# Patient Record
Sex: FEMALE | Race: WHITE | URBAN - METROPOLITAN AREA
[De-identification: names, ages, dates, MRNs, and addresses within clinical notes are randomized per-mention and may not be internally consistent; named-entity substitution may affect disease eponyms.]

---

## 2018-12-17 RX ORDER — FLUOXETINE HYDROCHLORIDE 40 MG/1
40 CAPSULE ORAL DAILY
Qty: 90 CAPSULE | Refills: 1 | Status: SHIPPED | OUTPATIENT
Start: 2018-12-17 | End: 2019-06-10 | Stop reason: SDUPTHER

## 2018-12-17 RX ORDER — BUPROPION HYDROCHLORIDE 150 MG/1
150 TABLET ORAL EVERY MORNING
Qty: 90 TABLET | Refills: 1 | Status: SHIPPED | OUTPATIENT
Start: 2018-12-17 | End: 2019-06-10 | Stop reason: SDUPTHER

## 2019-05-22 RX ORDER — ATENOLOL 25 MG/1
TABLET ORAL
Qty: 90 TABLET | Refills: 3 | Status: SHIPPED | OUTPATIENT
Start: 2019-05-22 | End: 2019-06-10 | Stop reason: SDUPTHER

## 2019-06-06 ENCOUNTER — TELEPHONE (OUTPATIENT)
Dept: PRIMARY CARE CLINIC | Age: 59
End: 2019-06-06

## 2019-06-06 NOTE — TELEPHONE ENCOUNTER
Pt's bp was refused by Dr. Angeles Henao. Pt will be out of meds the week of the 23rd. Asking if you could squeeze her in on 6/12 or 6/13 anytime and call her cell and leave date and time of appt. [phone]. Thankyou!

## 2019-06-10 RX ORDER — ATENOLOL 25 MG/1
TABLET ORAL
Qty: 90 TABLET | Refills: 0 | Status: SHIPPED | OUTPATIENT
Start: 2019-06-10 | End: 2019-08-23 | Stop reason: SDUPTHER

## 2019-06-10 RX ORDER — BUPROPION HYDROCHLORIDE 150 MG/1
150 TABLET ORAL EVERY MORNING
Qty: 90 TABLET | Refills: 0 | Status: SHIPPED | OUTPATIENT
Start: 2019-06-10 | End: 2019-07-01 | Stop reason: SDUPTHER

## 2019-06-10 RX ORDER — FLUOXETINE HYDROCHLORIDE 40 MG/1
40 CAPSULE ORAL DAILY
Qty: 90 CAPSULE | Refills: 0 | Status: SHIPPED | OUTPATIENT
Start: 2019-06-10 | End: 2019-07-01 | Stop reason: SDUPTHER

## 2019-07-01 RX ORDER — BUPROPION HYDROCHLORIDE 150 MG/1
TABLET ORAL
Qty: 30 TABLET | Refills: 1 | Status: SHIPPED | OUTPATIENT
Start: 2019-07-01 | End: 2019-09-19 | Stop reason: SDUPTHER

## 2019-07-01 RX ORDER — FLUOXETINE HYDROCHLORIDE 40 MG/1
CAPSULE ORAL
Qty: 30 CAPSULE | Refills: 1 | Status: SHIPPED | OUTPATIENT
Start: 2019-07-01 | End: 2019-08-23 | Stop reason: SDUPTHER

## 2019-07-24 ENCOUNTER — OFFICE VISIT (OUTPATIENT)
Dept: PRIMARY CARE CLINIC | Age: 59
End: 2019-07-24
Payer: COMMERCIAL

## 2019-07-24 VITALS
DIASTOLIC BLOOD PRESSURE: 78 MMHG | WEIGHT: 202.6 LBS | HEART RATE: 68 BPM | OXYGEN SATURATION: 97 % | SYSTOLIC BLOOD PRESSURE: 120 MMHG | BODY MASS INDEX: 38.28 KG/M2

## 2019-07-24 DIAGNOSIS — I10 ESSENTIAL HYPERTENSION: Primary | ICD-10-CM

## 2019-07-24 DIAGNOSIS — R53.83 FATIGUE, UNSPECIFIED TYPE: ICD-10-CM

## 2019-07-24 DIAGNOSIS — R06.83 SNORING: ICD-10-CM

## 2019-07-24 DIAGNOSIS — Z12.39 ENCOUNTER FOR SCREENING FOR MALIGNANT NEOPLASM OF BREAST: ICD-10-CM

## 2019-07-24 PROCEDURE — 99213 OFFICE O/P EST LOW 20 MIN: CPT | Performed by: FAMILY MEDICINE

## 2019-07-24 ASSESSMENT — ENCOUNTER SYMPTOMS
ABDOMINAL PAIN: 0
NAUSEA: 0
EYE DISCHARGE: 0
RHINORRHEA: 0
DIARRHEA: 0
WHEEZING: 0
COUGH: 0
SORE THROAT: 0
VOMITING: 0
EYE REDNESS: 0
SHORTNESS OF BREATH: 0

## 2019-07-24 ASSESSMENT — PATIENT HEALTH QUESTIONNAIRE - PHQ9: DEPRESSION UNABLE TO ASSESS: URGENT/EMERGENT SITUATION

## 2019-07-24 NOTE — PROGRESS NOTES
[facility] CARE  [address] [facility] [address]  Dept: 40 Candy Esqueda is a 62 y.o. female who presents today for her medical conditions/complaintsas noted below. Chief Complaint   Patient presents with    6 Month Follow-Up     Medication check. HPI:     HPI-has been feeling really tired. Feels like she is exhausted after just a 5 hour shift. Can sleep 12 hours and feels okay after that but then gets tired again really easily. LDL Cholesterol (mg/dL)   Date Value   12/10/2012 74     LDL Calculated (mg/dL)   Date Value   2018 37   2016 88       (goal LDL is <100)   AST (IU/L)   Date Value   2016 19     ALT (IU/L)   Date Value   2016 23     BUN (mg/dl)   Date Value   2016 15     BP Readings from Last 3 Encounters:   19 120/78   10/18/18 130/68   18 124/74          (goal 120/80)    Past Medical History:   Diagnosis Date    Alcohol dependence ([address]     CAD S/P percutaneous coronary angioplasty 12    LAD , ANGELINE    Depression     Dyslipidemia     HTN (hypertension)     Left ventricular diastolic dysfunction 1251    Obesity       Past Surgical History:   Procedure Laterality Date     SECTION      CORONARY ANGIOPLASTY WITH STENT PLACEMENT      MYRINGOTOMY AND TYMPANOSTOMY TUBE PLACEMENT         Family History   Problem Relation Age of Onset    Other Mother         hypertension    Macular Degen Other         macular degeration    Other Father         hx not known       Social History     Tobacco Use    Smoking status: Former Smoker     Packs/day: 1.00     Years: 40.00     Pack years: 40.00     Types: Cigarettes     Last attempt to quit: 2012     Years since quittin.6    Smokeless tobacco: Never Used   Substance Use Topics    Alcohol use:  Yes     Alcohol/week: 14.0 standard drinks     Types: 14 Cans of beer per week      Current Outpatient Medications

## 2020-03-06 ENCOUNTER — OFFICE VISIT (OUTPATIENT)
Dept: PRIMARY CARE CLINIC | Age: 60
End: 2020-03-06
Payer: COMMERCIAL

## 2020-03-06 VITALS
OXYGEN SATURATION: 97 % | WEIGHT: 208.8 LBS | BODY MASS INDEX: 39.45 KG/M2 | HEART RATE: 70 BPM | SYSTOLIC BLOOD PRESSURE: 128 MMHG | DIASTOLIC BLOOD PRESSURE: 70 MMHG

## 2020-03-06 PROCEDURE — G0444 DEPRESSION SCREEN ANNUAL: HCPCS | Performed by: FAMILY MEDICINE

## 2020-03-06 PROCEDURE — 1036F TOBACCO NON-USER: CPT | Performed by: FAMILY MEDICINE

## 2020-03-06 PROCEDURE — 99213 OFFICE O/P EST LOW 20 MIN: CPT | Performed by: FAMILY MEDICINE

## 2020-03-06 PROCEDURE — G8427 DOCREV CUR MEDS BY ELIG CLIN: HCPCS | Performed by: FAMILY MEDICINE

## 2020-03-06 PROCEDURE — 3017F COLORECTAL CA SCREEN DOC REV: CPT | Performed by: FAMILY MEDICINE

## 2020-03-06 PROCEDURE — G8417 CALC BMI ABV UP PARAM F/U: HCPCS | Performed by: FAMILY MEDICINE

## 2020-03-06 PROCEDURE — G8484 FLU IMMUNIZE NO ADMIN: HCPCS | Performed by: FAMILY MEDICINE

## 2020-03-06 RX ORDER — DICYCLOMINE HYDROCHLORIDE 10 MG/1
10 CAPSULE ORAL EVERY 6 HOURS PRN
Qty: 90 CAPSULE | Refills: 0 | Status: SHIPPED | OUTPATIENT
Start: 2020-03-06

## 2020-03-06 ASSESSMENT — ENCOUNTER SYMPTOMS
DIARRHEA: 0
RHINORRHEA: 0
NAUSEA: 0
ABDOMINAL PAIN: 0
COUGH: 0
EYE REDNESS: 0
VOMITING: 0
EYE DISCHARGE: 0
SHORTNESS OF BREATH: 0
WHEEZING: 0
SORE THROAT: 0

## 2020-03-06 ASSESSMENT — PATIENT HEALTH QUESTIONNAIRE - PHQ9
2. FEELING DOWN, DEPRESSED OR HOPELESS: 3
SUM OF ALL RESPONSES TO PHQ QUESTIONS 1-9: 12
SUM OF ALL RESPONSES TO PHQ QUESTIONS 1-9: 12
1. LITTLE INTEREST OR PLEASURE IN DOING THINGS: 3
10. IF YOU CHECKED OFF ANY PROBLEMS, HOW DIFFICULT HAVE THESE PROBLEMS MADE IT FOR YOU TO DO YOUR WORK, TAKE CARE OF THINGS AT HOME, OR GET ALONG WITH OTHER PEOPLE: 0
9. THOUGHTS THAT YOU WOULD BE BETTER OFF DEAD, OR OF HURTING YOURSELF: 0
7. TROUBLE CONCENTRATING ON THINGS, SUCH AS READING THE NEWSPAPER OR WATCHING TELEVISION: 0
6. FEELING BAD ABOUT YOURSELF - OR THAT YOU ARE A FAILURE OR HAVE LET YOURSELF OR YOUR FAMILY DOWN: 0
8. MOVING OR SPEAKING SO SLOWLY THAT OTHER PEOPLE COULD HAVE NOTICED. OR THE OPPOSITE, BEING SO FIGETY OR RESTLESS THAT YOU HAVE BEEN MOVING AROUND A LOT MORE THAN USUAL: 0
SUM OF ALL RESPONSES TO PHQ9 QUESTIONS 1 & 2: 6
3. TROUBLE FALLING OR STAYING ASLEEP: 3
4. FEELING TIRED OR HAVING LITTLE ENERGY: 3
5. POOR APPETITE OR OVEREATING: 0

## 2020-03-06 NOTE — PROGRESS NOTES
Alcohol/week: 14.0 standard drinks     Types: 14 Cans of beer per week      Current Outpatient Medications   Medication Sig Dispense Refill    dicyclomine (BENTYL) 10 MG capsule Take 1 capsule by mouth every 6 hours as needed (cramping) 90 capsule 0    buPROPion (WELLBUTRIN XL) 150 MG extended release tablet TAKE 1 TABLET EVERY MORNING 90 tablet 3    FLUoxetine (PROZAC) 40 MG capsule TAKE 1 CAPSULE DAILY 90 capsule 3    atenolol (TENORMIN) 25 MG tablet TAKE 1 TABLET DAILY 90 tablet 3    triamcinolone (KENALOG) 0.147 MG/GM topical spray Apply topically 2 times daily. 100 g 3    desoximetasone (TOPICORT) 0.05 % cream Apply 1 Squirt topically 2 times daily Apply topically 2 times daily.  fluticasone (FLONASE) 50 MCG/ACT nasal spray 1 spray by Nasal route daily      aspirin 325 MG tablet Take 81 mg by mouth daily       clopidogrel (PLAVIX) 75 MG tablet Take 75 mg by mouth daily.  atorvastatin (LIPITOR) 40 MG tablet Take 40 mg by mouth daily.  lisinopril-hydrochlorothiazide (PRINZIDE;ZESTORETIC) 10-12.5 MG per tablet Take 1 tablet by mouth daily.  nitroGLYCERIN (NITROSTAT) 0.4 MG SL tablet Place 0.4 mg under the tongue every 5 minutes as needed. No current facility-administered medications for this visit.       Allergies   Allergen Reactions    Amoxicillin     Trimethoprim     Pcn [Penicillins] Rash    Sulfa Antibiotics Rash       Health Maintenance   Topic Date Due    Cervical cancer screen  07/15/1979    Shingles Vaccine (1 of 2) 07/15/2008    Colon cancer screen colonoscopy  07/15/2008    Low dose CT lung screening  07/15/2013    Lipid screen  08/21/2019    Potassium monitoring  08/21/2019    Creatinine monitoring  08/21/2019    Flu vaccine (1) 09/01/2019    Breast cancer screen  08/21/2020    DTaP/Tdap/Td vaccine (2 - Td) 08/10/2022    Hepatitis C screen  Completed    HIV screen  Completed    Hepatitis A vaccine  Aged Out    Hepatitis B vaccine  Aged Out    Hib Content: Thought content normal.         Assessment:       Diagnosis Orders   1. Essential hypertension  CBC Auto Differential    Comprehensive Metabolic Panel    Vitamin B12 & Folate    Magnesium    TSH without Reflex   2. Mixed hyperlipidemia  CBC Auto Differential    Comprehensive Metabolic Panel    Vitamin B12 & Folate    Magnesium    TSH without Reflex   3. Left ventricular diastolic dysfunction  CBC Auto Differential    Comprehensive Metabolic Panel    Vitamin B12 & Folate    Magnesium    TSH without Reflex   4. Fatigue, unspecified type  CBC Auto Differential    Comprehensive Metabolic Panel    Vitamin B12 & Folate    Magnesium    TSH without Reflex        Plan:      Return in about 3 months (around 6/6/2020) for HTN f/u. Orders Placed This Encounter   Procedures    CBC Auto Differential     Standing Status:   Future     Standing Expiration Date:   3/7/2021    Comprehensive Metabolic Panel     Standing Status:   Future     Standing Expiration Date:   3/7/2021    Vitamin B12 & Folate     Standing Status:   Future     Standing Expiration Date:   3/7/2021    Magnesium     Standing Status:   Future     Standing Expiration Date:   3/6/2021    TSH without Reflex     Standing Status:   Future     Standing Expiration Date:   3/7/2021     Orders Placed This Encounter   Medications    dicyclomine (BENTYL) 10 MG capsule     Sig: Take 1 capsule by mouth every 6 hours as needed (cramping)     Dispense:  90 capsule     Refill:  0       Patient given educationalmaterials - see patient instructions. Discussed use, benefit, and side effectsof prescribed medications. All patient questions answered. Pt voiced understanding. Reviewed health maintenance. Instructed to continue current medications, diet andexercise. Patient agreed with treatment plan. Follow up as directed.      Electronicallysigned by Terence Wong MD on 3/6/2020 at 2:21 PM

## 2020-03-11 ENCOUNTER — HOSPITAL ENCOUNTER (OUTPATIENT)
Age: 60
Setting detail: SPECIMEN
Discharge: HOME | End: 2020-03-11
Payer: COMMERCIAL

## 2020-03-11 LAB
ABSOLUTE EOS #: 0.15 K/UL (ref 0–0.44)
ABSOLUTE IMMATURE GRANULOCYTE: <0.03 K/UL (ref 0–0.3)
ABSOLUTE LYMPH #: 1.64 K/UL (ref 1.1–3.7)
ABSOLUTE MONO #: 0.91 K/UL (ref 0.1–1.2)
ALBUMIN SERPL-MCNC: 3.9 G/DL (ref 3.5–5.2)
ALBUMIN/GLOBULIN RATIO: 1.3 (ref 1–2.5)
ALP BLD-CCNC: 95 U/L (ref 35–104)
ALT SERPL-CCNC: 20 U/L (ref 5–33)
ANION GAP SERPL CALCULATED.3IONS-SCNC: 12 MMOL/L (ref 9–17)
AST SERPL-CCNC: 18 U/L
BASOPHILS # BLD: 1 % (ref 0–2)
BASOPHILS ABSOLUTE: 0.06 K/UL (ref 0–0.2)
BILIRUB SERPL-MCNC: 0.7 MG/DL (ref 0.3–1.2)
BUN BLDV-MCNC: 16 MG/DL (ref 8–23)
BUN/CREAT BLD: NORMAL (ref 9–20)
CALCIUM SERPL-MCNC: 9.4 MG/DL (ref 8.6–10.4)
CHLORIDE BLD-SCNC: 101 MMOL/L (ref 98–107)
CO2: 26 MMOL/L (ref 20–31)
CREAT SERPL-MCNC: 0.74 MG/DL (ref 0.5–0.9)
DIFFERENTIAL TYPE: ABNORMAL
EOSINOPHILS RELATIVE PERCENT: 2 % (ref 1–4)
FOLATE: 11.5 NG/ML
GFR AFRICAN AMERICAN: >60 ML/MIN
GFR NON-AFRICAN AMERICAN: >60 ML/MIN
GFR SERPL CREATININE-BSD FRML MDRD: NORMAL ML/MIN/{1.73_M2}
GFR SERPL CREATININE-BSD FRML MDRD: NORMAL ML/MIN/{1.73_M2}
GLUCOSE BLD-MCNC: 96 MG/DL (ref 70–99)
HCT VFR BLD CALC: 39.8 % (ref 36.3–47.1)
HEMOGLOBIN: 13.1 G/DL (ref 11.9–15.1)
IMMATURE GRANULOCYTES: 0 %
LYMPHOCYTES # BLD: 20 % (ref 24–43)
MAGNESIUM: 2.1 MG/DL (ref 1.6–2.6)
MCH RBC QN AUTO: 31.6 PG (ref 25.2–33.5)
MCHC RBC AUTO-ENTMCNC: 32.9 G/DL (ref 28.4–34.8)
MCV RBC AUTO: 96.1 FL (ref 82.6–102.9)
MONOCYTES # BLD: 11 % (ref 3–12)
NRBC AUTOMATED: 0 PER 100 WBC
PDW BLD-RTO: 12.4 % (ref 11.8–14.4)
PLATELET # BLD: 298 K/UL (ref 138–453)
PLATELET ESTIMATE: ABNORMAL
PMV BLD AUTO: 10.5 FL (ref 8.1–13.5)
POTASSIUM SERPL-SCNC: 4.4 MMOL/L (ref 3.7–5.3)
RBC # BLD: 4.14 M/UL (ref 3.95–5.11)
RBC # BLD: ABNORMAL 10*6/UL
SEG NEUTROPHILS: 66 % (ref 36–65)
SEGMENTED NEUTROPHILS ABSOLUTE COUNT: 5.48 K/UL (ref 1.5–8.1)
SODIUM BLD-SCNC: 139 MMOL/L (ref 135–144)
TOTAL PROTEIN: 6.8 G/DL (ref 6.4–8.3)
TSH SERPL DL<=0.05 MIU/L-ACNC: 2.38 MIU/L (ref 0.3–5)
VITAMIN B-12: 528 PG/ML (ref 232–1245)
WBC # BLD: 8.3 K/UL (ref 3.5–11.3)
WBC # BLD: ABNORMAL 10*3/UL

## 2020-06-12 ENCOUNTER — OFFICE VISIT (OUTPATIENT)
Dept: PRIMARY CARE CLINIC | Age: 60
End: 2020-06-12
Payer: COMMERCIAL

## 2020-06-12 VITALS
WEIGHT: 210.8 LBS | TEMPERATURE: 97.3 F | HEART RATE: 75 BPM | OXYGEN SATURATION: 96 % | DIASTOLIC BLOOD PRESSURE: 68 MMHG | SYSTOLIC BLOOD PRESSURE: 128 MMHG | BODY MASS INDEX: 39.83 KG/M2

## 2020-06-12 PROBLEM — F34.1 DYSTHYMIA: Status: ACTIVE | Noted: 2020-06-12

## 2020-06-12 PROCEDURE — G8417 CALC BMI ABV UP PARAM F/U: HCPCS | Performed by: FAMILY MEDICINE

## 2020-06-12 PROCEDURE — 3017F COLORECTAL CA SCREEN DOC REV: CPT | Performed by: FAMILY MEDICINE

## 2020-06-12 PROCEDURE — 99213 OFFICE O/P EST LOW 20 MIN: CPT | Performed by: FAMILY MEDICINE

## 2020-06-12 PROCEDURE — G8427 DOCREV CUR MEDS BY ELIG CLIN: HCPCS | Performed by: FAMILY MEDICINE

## 2020-06-12 PROCEDURE — 1036F TOBACCO NON-USER: CPT | Performed by: FAMILY MEDICINE

## 2020-06-12 ASSESSMENT — ENCOUNTER SYMPTOMS
ABDOMINAL PAIN: 0
SORE THROAT: 0
SHORTNESS OF BREATH: 0
RHINORRHEA: 0
EYE DISCHARGE: 0
EYE REDNESS: 0
NAUSEA: 0
COUGH: 0
DIARRHEA: 0
WHEEZING: 0
VOMITING: 0

## 2020-06-12 NOTE — PROGRESS NOTES
Alcohol/week: 14.0 standard drinks     Types: 14 Cans of beer per week      Current Outpatient Medications   Medication Sig Dispense Refill    folic acid-pyridoxine-cyanocobalamine (FOLTX) 2.5-25-1 MG TABS tablet Take 1 tablet by mouth daily      dicyclomine (BENTYL) 10 MG capsule Take 1 capsule by mouth every 6 hours as needed (cramping) 90 capsule 0    buPROPion (WELLBUTRIN XL) 150 MG extended release tablet TAKE 1 TABLET EVERY MORNING 90 tablet 3    FLUoxetine (PROZAC) 40 MG capsule TAKE 1 CAPSULE DAILY 90 capsule 3    atenolol (TENORMIN) 25 MG tablet TAKE 1 TABLET DAILY 90 tablet 3    triamcinolone (KENALOG) 0.147 MG/GM topical spray Apply topically 2 times daily. 100 g 3    desoximetasone (TOPICORT) 0.05 % cream Apply 1 Squirt topically 2 times daily Apply topically 2 times daily.  fluticasone (FLONASE) 50 MCG/ACT nasal spray 1 spray by Nasal route daily      aspirin 325 MG tablet Take 81 mg by mouth daily       clopidogrel (PLAVIX) 75 MG tablet Take 75 mg by mouth daily.  atorvastatin (LIPITOR) 40 MG tablet Take 40 mg by mouth daily.  lisinopril-hydrochlorothiazide (PRINZIDE;ZESTORETIC) 10-12.5 MG per tablet Take 1 tablet by mouth daily.  nitroGLYCERIN (NITROSTAT) 0.4 MG SL tablet Place 0.4 mg under the tongue every 5 minutes as needed. No current facility-administered medications for this visit.       Allergies   Allergen Reactions    Amoxicillin     Trimethoprim     Pcn [Penicillins] Rash    Sulfa Antibiotics Rash       Health Maintenance   Topic Date Due    Cervical cancer screen  07/15/1979    Shingles Vaccine (1 of 2) 07/15/2008    Colon cancer screen colonoscopy  07/15/2008    Low dose CT lung screening  07/15/2013    Lipid screen  08/21/2019    Breast cancer screen  08/21/2020    Flu vaccine (Season Ended) 09/01/2020    Potassium monitoring  03/11/2021    Creatinine monitoring  03/11/2021    DTaP/Tdap/Td vaccine (2 - Td) 08/10/2022    Hepatitis C screen Completed    HIV screen  Completed    Hepatitis A vaccine  Aged Out    Hepatitis B vaccine  Aged Out    Hib vaccine  Aged Out    Meningococcal (ACWY) vaccine  Aged Out    Pneumococcal 0-64 years Vaccine  Aged Out       Subjective:      Review of Systems   Constitutional: Negative for chills and fever. HENT: Negative for rhinorrhea and sore throat. Eyes: Negative for discharge and redness. Respiratory: Negative for cough, shortness of breath and wheezing. Cardiovascular: Negative for chest pain and palpitations. Gastrointestinal: Negative for abdominal pain, diarrhea, nausea and vomiting. Genitourinary: Negative for dysuria and frequency. Musculoskeletal: Negative for arthralgias and myalgias. Neurological: Negative for dizziness, light-headedness and headaches. Psychiatric/Behavioral: Positive for sleep disturbance (better since back to work). Objective:     /68   Pulse 75   Temp 97.3 °F (36.3 °C)   Wt 210 lb 12.8 oz (95.6 kg)   SpO2 96%   BMI 39.83 kg/m²   Physical Exam  Vitals signs and nursing note reviewed. Constitutional:       General: She is not in acute distress. Appearance: She is well-developed. HENT:      Head: Normocephalic and atraumatic. Ears:      Comments: Left TM with no tube seen  Right TM obscured by wax  Eyes:      General: No scleral icterus. Right eye: No discharge. Left eye: No discharge. Conjunctiva/sclera: Conjunctivae normal.      Pupils: Pupils are equal, round, and reactive to light. Neck:      Thyroid: No thyromegaly. Trachea: No tracheal deviation. Cardiovascular:      Rate and Rhythm: Normal rate and regular rhythm. Heart sounds: Normal heart sounds. Comments: No carotid bruits  Pulmonary:      Effort: Pulmonary effort is normal. No respiratory distress. Breath sounds: Normal breath sounds. No wheezing. Lymphadenopathy:      Cervical: No cervical adenopathy.    Skin:     General: Skin is warm. Findings: No rash. Neurological:      Mental Status: She is alert and oriented to person, place, and time. Psychiatric:         Behavior: Behavior normal.         Thought Content: Thought content normal.         Assessment:       Diagnosis Orders   1. Essential hypertension     2. Left ventricular diastolic dysfunction     3. Dysthymia          Plan:      Return in about 3 months (around 9/12/2020) for HTN f/u, CAD f/u. No orders of the defined types were placed in this encounter. No orders of the defined types were placed in this encounter. Patient given educationalmaterials - see patient instructions. Discussed use, benefit, and side effectsof prescribed medications. All patient questions answered. Pt voiced understanding. Reviewed health maintenance. Instructed to continue current medications, diet andexercise. Patient agreed with treatment plan. Follow up as directed.      Electronicallysigned by Lucinda Cardozo MD on 6/12/2020 at 1:25 PM

## 2020-10-05 RX ORDER — BUPROPION HYDROCHLORIDE 150 MG/1
TABLET ORAL
Qty: 90 TABLET | Refills: 3 | Status: SHIPPED | OUTPATIENT
Start: 2020-10-05 | End: 2021-01-21 | Stop reason: SDUPTHER

## 2020-10-05 RX ORDER — FLUOXETINE HYDROCHLORIDE 40 MG/1
CAPSULE ORAL
Qty: 90 CAPSULE | Refills: 3 | Status: SHIPPED | OUTPATIENT
Start: 2020-10-05 | End: 2021-01-21 | Stop reason: SDUPTHER

## 2020-10-05 RX ORDER — ATENOLOL 25 MG/1
TABLET ORAL
Qty: 90 TABLET | Refills: 3 | Status: SHIPPED | OUTPATIENT
Start: 2020-10-05 | End: 2021-01-11

## 2020-12-18 ENCOUNTER — OFFICE VISIT (OUTPATIENT)
Dept: PRIMARY CARE CLINIC | Age: 60
End: 2020-12-18
Payer: COMMERCIAL

## 2020-12-18 VITALS
OXYGEN SATURATION: 97 % | BODY MASS INDEX: 40.72 KG/M2 | HEIGHT: 60 IN | WEIGHT: 207.4 LBS | TEMPERATURE: 98.4 F | DIASTOLIC BLOOD PRESSURE: 70 MMHG | SYSTOLIC BLOOD PRESSURE: 118 MMHG | HEART RATE: 69 BPM

## 2020-12-18 PROCEDURE — 3017F COLORECTAL CA SCREEN DOC REV: CPT | Performed by: FAMILY MEDICINE

## 2020-12-18 PROCEDURE — G8427 DOCREV CUR MEDS BY ELIG CLIN: HCPCS | Performed by: FAMILY MEDICINE

## 2020-12-18 PROCEDURE — 99213 OFFICE O/P EST LOW 20 MIN: CPT | Performed by: FAMILY MEDICINE

## 2020-12-18 PROCEDURE — G8417 CALC BMI ABV UP PARAM F/U: HCPCS | Performed by: FAMILY MEDICINE

## 2020-12-18 PROCEDURE — G8484 FLU IMMUNIZE NO ADMIN: HCPCS | Performed by: FAMILY MEDICINE

## 2020-12-18 PROCEDURE — 1036F TOBACCO NON-USER: CPT | Performed by: FAMILY MEDICINE

## 2020-12-18 SDOH — ECONOMIC STABILITY: FOOD INSECURITY: WITHIN THE PAST 12 MONTHS, YOU WORRIED THAT YOUR FOOD WOULD RUN OUT BEFORE YOU GOT MONEY TO BUY MORE.: NEVER TRUE

## 2020-12-18 SDOH — ECONOMIC STABILITY: TRANSPORTATION INSECURITY
IN THE PAST 12 MONTHS, HAS THE LACK OF TRANSPORTATION KEPT YOU FROM MEDICAL APPOINTMENTS OR FROM GETTING MEDICATIONS?: NO

## 2020-12-18 SDOH — ECONOMIC STABILITY: INCOME INSECURITY: HOW HARD IS IT FOR YOU TO PAY FOR THE VERY BASICS LIKE FOOD, HOUSING, MEDICAL CARE, AND HEATING?: NOT HARD AT ALL

## 2020-12-18 SDOH — ECONOMIC STABILITY: TRANSPORTATION INSECURITY
IN THE PAST 12 MONTHS, HAS LACK OF TRANSPORTATION KEPT YOU FROM MEETINGS, WORK, OR FROM GETTING THINGS NEEDED FOR DAILY LIVING?: NO

## 2020-12-18 SDOH — ECONOMIC STABILITY: FOOD INSECURITY: WITHIN THE PAST 12 MONTHS, THE FOOD YOU BOUGHT JUST DIDN'T LAST AND YOU DIDN'T HAVE MONEY TO GET MORE.: NEVER TRUE

## 2020-12-18 ASSESSMENT — ENCOUNTER SYMPTOMS
DIARRHEA: 0
SORE THROAT: 0
VOMITING: 0
COUGH: 0
CONSTIPATION: 0
BLOOD IN STOOL: 0
NAUSEA: 0
SHORTNESS OF BREATH: 0

## 2020-12-18 NOTE — PROGRESS NOTES
[address] [facility] PRIMARY CARE  [address] [address]  [facility] [address]  Dept: 40 Candy Esqueda is a 56 y.o. female who presents today for her medical conditions/complaintsas noted below. Chief Complaint   Patient presents with    Hypertension     Patient doesn't check bp at home    Medication Check       HPI:     HPI- is due for colon cancer screening again soon. Due for physical.  Doing okay. Acid reflux has been acting up. Feels like med is not working. Took meds and had some toast and coffee. Had significant burning in her chest. Had to leave work. That was last week. Happened once before a few months ago. No difference in routine so not sure why it happened. Right arm was hurting also but thought that was work related. No other symptoms.       LDL Cholesterol (mg/dL)   Date Value   12/10/2012 74     LDL Calculated (mg/dL)   Date Value   2018 37   2016 88       (goal LDL is <100)   AST (U/L)   Date Value   2020 18     ALT (U/L)   Date Value   2020 20     BUN (mg/dL)   Date Value   2020 16     BP Readings from Last 3 Encounters:   20 118/70   20 128/68   20 128/70          (goal 120/80)    Past Medical History:   Diagnosis Date    Alcohol dependence ([facility] [address]     CAD S/P percutaneous coronary angioplasty 12    LAD , ANGELINE    Depression     Dyslipidemia     HTN (hypertension)     Left ventricular diastolic dysfunction 8405    Obesity     Seborrheic keratoses, inflamed 2016    Skin sensation disturbance 2016      Past Surgical History:   Procedure Laterality Date     SECTION      CORONARY ANGIOPLASTY WITH STENT PLACEMENT      MYRINGOTOMY AND TYMPANOSTOMY TUBE PLACEMENT         Family History   Problem Relation Age of Onset    Other Mother         hypertension    Macular Degen Other         macular degeration    Other Father         hx not known       Social History screening  07/15/2013    Lipid screen  08/21/2019    Breast cancer screen  08/21/2020    Flu vaccine (1) 12/18/2021 (Originally 9/1/2020)    Potassium monitoring  03/11/2021    Creatinine monitoring  03/11/2021    DTaP/Tdap/Td vaccine (2 - Td) 08/10/2022    Hepatitis C screen  Completed    HIV screen  Completed    Hepatitis A vaccine  Aged Out    Hepatitis B vaccine  Aged Out    Hib vaccine  Aged Out    Meningococcal (ACWY) vaccine  Aged Out    Pneumococcal 0-64 years Vaccine  Aged Out       Subjective:      Review of Systems   Constitutional: Negative for chills and fever. HENT: Positive for congestion (august- allergies). Negative for ear pain, postnasal drip and sore throat. Respiratory: Negative for cough and shortness of breath. Cardiovascular: Negative for chest pain and palpitations. Gastrointestinal: Negative for blood in stool, constipation, diarrhea, nausea and vomiting. Neurological: Negative for dizziness, light-headedness and headaches. Psychiatric/Behavioral: Negative for sleep disturbance. Objective:     /70   Pulse 69   Temp 98.4 °F (36.9 °C)   Ht 5' (1.524 m)   Wt 207 lb 6.4 oz (94.1 kg)   SpO2 97%   BMI 40.51 kg/m²   Physical Exam  Vitals signs and nursing note reviewed. Constitutional:       General: She is not in acute distress. Appearance: She is well-developed. She is not ill-appearing. HENT:      Head: Normocephalic and atraumatic. Right Ear: External ear normal.      Left Ear: External ear normal.   Eyes:      General: No scleral icterus. Right eye: No discharge. Left eye: No discharge. Conjunctiva/sclera: Conjunctivae normal.      Pupils: Pupils are equal, round, and reactive to light. Neck:      Thyroid: No thyromegaly. Trachea: No tracheal deviation. Cardiovascular:      Rate and Rhythm: Normal rate and regular rhythm. Heart sounds: Normal heart sounds.    Pulmonary:      Effort: Pulmonary effort is normal. No respiratory distress. Breath sounds: Normal breath sounds. No wheezing. Lymphadenopathy:      Cervical: No cervical adenopathy. Skin:     General: Skin is warm. Findings: No rash. Neurological:      Mental Status: She is alert and oriented to person, place, and time. Psychiatric:         Mood and Affect: Mood normal.         Behavior: Behavior normal.         Thought Content: Thought content normal.         Assessment:       Diagnosis Orders   1. Essential hypertension     2. Mixed hyperlipidemia     3. Left ventricular diastolic dysfunction          Plan:    keep track of diet and when getting GERD symptoms. Return in about 3 months (around 3/18/2021) for well visit/ HM . No orders of the defined types were placed in this encounter. No orders of the defined types were placed in this encounter. Patient given educationalmaterials - see patient instructions. Discussed use, benefit, and side effectsof prescribed medications. All patient questions answered. Pt voiced understanding. Reviewed health maintenance. Instructed to continue current medications, diet andexercise. Patient agreed with treatment plan. Follow up as directed.      Electronicallysigned by Sofy Gonzalez MD on 12/18/2020 at 11:17 AM

## 2021-01-11 RX ORDER — ATENOLOL 25 MG/1
TABLET ORAL
Qty: 30 TABLET | Refills: 11 | Status: SHIPPED | OUTPATIENT
Start: 2021-01-11 | End: 2022-01-20

## 2021-01-21 RX ORDER — FLUOXETINE HYDROCHLORIDE 40 MG/1
CAPSULE ORAL
Qty: 90 CAPSULE | Refills: 3 | Status: SHIPPED | OUTPATIENT
Start: 2021-01-21 | End: 2021-12-27

## 2021-01-21 RX ORDER — BUPROPION HYDROCHLORIDE 150 MG/1
150 TABLET ORAL EVERY MORNING
Qty: 90 TABLET | Refills: 3 | Status: SHIPPED | OUTPATIENT
Start: 2021-01-21 | End: 2021-12-27

## 2021-01-26 ENCOUNTER — OFFICE VISIT (OUTPATIENT)
Dept: PRIMARY CARE CLINIC | Age: 61
End: 2021-01-26
Payer: COMMERCIAL

## 2021-01-26 VITALS
HEART RATE: 62 BPM | BODY MASS INDEX: 40.86 KG/M2 | DIASTOLIC BLOOD PRESSURE: 60 MMHG | TEMPERATURE: 96.9 F | SYSTOLIC BLOOD PRESSURE: 119 MMHG | WEIGHT: 209.2 LBS | OXYGEN SATURATION: 100 %

## 2021-01-26 DIAGNOSIS — R42 VERTIGO: Primary | ICD-10-CM

## 2021-01-26 PROCEDURE — 99213 OFFICE O/P EST LOW 20 MIN: CPT | Performed by: NURSE PRACTITIONER

## 2021-01-26 RX ORDER — MECLIZINE HYDROCHLORIDE 25 MG/1
25 TABLET ORAL 3 TIMES DAILY PRN
Qty: 30 TABLET | Refills: 0 | Status: SHIPPED | OUTPATIENT
Start: 2021-01-26 | End: 2021-02-05

## 2021-01-26 RX ORDER — PREDNISONE 20 MG/1
20 TABLET ORAL DAILY
Qty: 7 TABLET | Refills: 0 | Status: SHIPPED | OUTPATIENT
Start: 2021-01-26 | End: 2021-02-02

## 2021-01-26 ASSESSMENT — ENCOUNTER SYMPTOMS
SHORTNESS OF BREATH: 0
SORE THROAT: 0
COUGH: 0
ABDOMINAL PAIN: 0
SWOLLEN GLANDS: 0
VOMITING: 0
NAUSEA: 1

## 2021-01-26 NOTE — PROGRESS NOTES
ANGIOPLASTY WITH STENT PLACEMENT      MYRINGOTOMY AND TYMPANOSTOMY TUBE PLACEMENT         Family History   Problem Relation Age of Onset    Other Mother         hypertension    Macular Degen Other         macular degeration    Other Father         hx not known       Social History     Tobacco Use    Smoking status: Former Smoker     Packs/day: 1.00     Years: 40.00     Pack years: 40.00     Types: Cigarettes     Quit date: 2012     Years since quittin.1    Smokeless tobacco: Never Used   Substance Use Topics    Alcohol use: Yes     Alcohol/week: 14.0 standard drinks     Types: 14 Cans of beer per week      Current Outpatient Medications   Medication Sig Dispense Refill    predniSONE (DELTASONE) 20 MG tablet Take 1 tablet by mouth daily for 7 days 7 tablet 0    meclizine (ANTIVERT) 25 MG tablet Take 1 tablet by mouth 3 times daily as needed for Dizziness 30 tablet 0    FLUoxetine (PROZAC) 40 MG capsule TAKE 1 CAPSULE DAILY 90 capsule 3    buPROPion (WELLBUTRIN XL) 150 MG extended release tablet Take 1 tablet by mouth every morning 90 tablet 3    atenolol (TENORMIN) 25 MG tablet TAKE 1 TABLET BY MOUTH EVERY DAY 30 tablet 11    folic acid-pyridoxine-cyanocobalamine (FOLTX) 2.5-25-1 MG TABS tablet Take 1 tablet by mouth daily      dicyclomine (BENTYL) 10 MG capsule Take 1 capsule by mouth every 6 hours as needed (cramping) 90 capsule 0    triamcinolone (KENALOG) 0.147 MG/GM topical spray Apply topically 2 times daily. 100 g 3    desoximetasone (TOPICORT) 0.05 % cream Apply 1 Squirt topically 2 times daily Apply topically 2 times daily.  fluticasone (FLONASE) 50 MCG/ACT nasal spray 1 spray by Nasal route daily      aspirin 325 MG tablet Take 81 mg by mouth daily       clopidogrel (PLAVIX) 75 MG tablet Take 75 mg by mouth daily.  atorvastatin (LIPITOR) 40 MG tablet Take 40 mg by mouth daily.       lisinopril-hydrochlorothiazide (PRINZIDE;ZESTORETIC) 10-12.5 MG per tablet Take 1 tablet by mouth daily.  nitroGLYCERIN (NITROSTAT) 0.4 MG SL tablet Place 0.4 mg under the tongue every 5 minutes as needed. No current facility-administered medications for this visit. Allergies   Allergen Reactions    Amoxicillin     Trimethoprim     Pcn [Penicillins] Rash    Sulfa Antibiotics Rash       Health Maintenance   Topic Date Due    Cervical cancer screen  07/15/1979    Shingles Vaccine (1 of 2) 07/15/2008    Colon cancer screen colonoscopy  07/15/2008    Low dose CT lung screening  07/15/2013    Lipid screen  08/21/2019    Breast cancer screen  08/21/2020    Flu vaccine (1) 12/18/2021 (Originally 9/1/2020)    Potassium monitoring  03/11/2021    Creatinine monitoring  03/11/2021    DTaP/Tdap/Td vaccine (2 - Td) 08/10/2022    Hepatitis C screen  Completed    HIV screen  Completed    Hepatitis A vaccine  Aged Out    Hepatitis B vaccine  Aged Out    Hib vaccine  Aged Out    Meningococcal (ACWY) vaccine  Aged Out    Pneumococcal 0-64 years Vaccine  Aged Out       Subjective:      Review of Systems   Constitutional: Positive for fatigue. Negative for chills and fever. HENT: Positive for congestion (it has cleared up). Negative for sore throat. Respiratory: Negative for cough and shortness of breath. Cardiovascular: Negative for chest pain and palpitations. Gastrointestinal: Positive for nausea. Negative for abdominal pain, anorexia and vomiting. Genitourinary: Negative for difficulty urinating and dysuria. Neurological: Positive for dizziness and vertigo. Negative for headaches. Psychiatric/Behavioral: Negative for sleep disturbance. The patient is not nervous/anxious. Objective:     /60   Pulse 62   Temp 96.9 °F (36.1 °C)   Wt 209 lb 3.2 oz (94.9 kg)   SpO2 100%   BMI 40.86 kg/m²   Physical Exam  Vitals signs and nursing note reviewed. Constitutional:       General: She is not in acute distress. Appearance: She is well-developed.  She is obese. She is not ill-appearing. HENT:      Head: Normocephalic and atraumatic. Right Ear: External ear normal.      Left Ear: External ear normal.   Eyes:      Conjunctiva/sclera: Conjunctivae normal.      Pupils: Pupils are equal, round, and reactive to light. Neck:      Thyroid: No thyromegaly. Trachea: No tracheal deviation. Cardiovascular:      Rate and Rhythm: Normal rate and regular rhythm. Heart sounds: Normal heart sounds. Comments: No carotid bruit  Pulmonary:      Effort: Pulmonary effort is normal. No respiratory distress. Breath sounds: Normal breath sounds. No wheezing. Abdominal:      General: Bowel sounds are normal.      Palpations: Abdomen is soft. Lymphadenopathy:      Cervical: No cervical adenopathy. Skin:     General: Skin is warm. Findings: No rash. Neurological:      Mental Status: She is alert and oriented to person, place, and time. Cranial Nerves: Cranial nerves are intact. Comments: Epley's maneuver positive    Psychiatric:         Mood and Affect: Mood normal.         Behavior: Behavior normal.         Thought Content: Thought content normal.         Assessment:       Diagnosis Orders   1. Vertigo  predniSONE (DELTASONE) 20 MG tablet    meclizine (ANTIVERT) 25 MG tablet        Plan:    Do  Epley exercises 3x/day  Prednisone 20 mg daily X 1 week  Meclizine PRN for dizziness    Return if symptoms worsen or fail to improve. No orders of the defined types were placed in this encounter. Orders Placed This Encounter   Medications    predniSONE (DELTASONE) 20 MG tablet     Sig: Take 1 tablet by mouth daily for 7 days     Dispense:  7 tablet     Refill:  0    meclizine (ANTIVERT) 25 MG tablet     Sig: Take 1 tablet by mouth 3 times daily as needed for Dizziness     Dispense:  30 tablet     Refill:  0       Patient given educationalmaterials - see patient instructions. Discussed use, benefit, and side effectsof prescribed medications. All patient questions answered. Pt voiced understanding. Reviewed health maintenance. Instructed to continue current medications, diet andexercise. Patient agreed with treatment plan. Follow up as directed.      Electronicallysigned by RANULFO Sandoval CNP on 1/26/2021 at 2:22 PM

## 2021-01-26 NOTE — LETTER
[facility]  [address] [place] [id number]  Phone: [phone]  Fax: [phone] [facility]        January 26, 2021     Patient: [id number] [facility] [address]   YOB: 1960   Date of Visit: 1/26/2021       To Whom it May Concern: Quincy Peters was seen in my clinic on 1/26/2021. She may return to work on 2/01/21. If you have any questions or concerns, please don't hesitate to call.     Sincerely,         RANULFO Suarez - CNP

## 2021-01-26 NOTE — PATIENT INSTRUCTIONS
Patient Education        Epley Maneuver at Home for Vertigo: Exercises  Introduction  Vertigo is a spinning or whirling sensation when you move your head. Your doctor may have moved you in different positions to help your vertigo get better faster. This is called the Epley maneuver. Your doctor also may have asked you to do these exercises at home. Do the exercises as often as your doctor recommends. If your vertigo is getting worse, your doctor may have you change the exercise or stop it. Step 1  Step 1   1. Sit on the edge of a bed or sofa. Step 2   1. Turn your head 45 degrees in the direction your doctor told you to. This should be toward the ear that causes the most vertigo for you. In this picture, the woman is turning toward her left ear. Step 3   1. Tilt yourself backward until you are lying on your back. Your head should still be at a 45-degree turn. Your head should be about midway between looking straight ahead and looking out to your side. Hold for 30 seconds. If you have vertigo, stay in this position until it stops. Step 4   1. Turn your head 90 degrees toward the ear that has the least vertigo. In this picture, the woman is turning to the right because she has vertigo on her left side. The point of your chin should be raised and over your shoulder. Hold for 30 seconds. Step 5   1. Roll onto the side with the least vertigo. You should now be looking at the floor. Hold for 30 seconds. Follow-up care is a key part of your treatment and safety. Be sure to make and go to all appointments, and call your doctor if you are having problems. It's also a good idea to know your test results and keep a list of the medicines you take. Where can you learn more? Go to [email or web address]://chlamar.Link [email or web address] org and sign in to your [employer] account. Enter Y181 in the modu box to learn more about \"Epley Maneuver at Home for Vertigo: Exercises. \" If you do not have an account, please click on the \"Sign Up Now\" link. Current as of: November 20, 2019               Content Version: 12.6  © 2006-2020 [employer]. Care instructions adapted under license by [facility] ([facility]). If you have questions about a medical condition or this instruction, always ask your healthcare professional. [address] any warranty or liability for your use of this information. Patient Education        Epley Maneuver for Vertigo: Exercises  Introduction  The Epley maneuver is a series of movements your doctor may use to treat your vertigo. Here are the steps for the exercises. Your doctor or physical therapist will guide you through the movements. A single 10- to 15-minute session often is all that's needed. Crystal debris (canaliths) cause the vertigo. When your head is moved into different positions, the debris moves freely. This may cause your symptoms to stop. How to do the exercises  Step 1   1. You will sit on the doctor's exam table. Your legs will be out in front of you. The doctor or physical therapist will turn your head so that it is USP between looking straight ahead and looking to the side that causes the worst vertigo. 2. Without changing your head position, he or she will guide you back quickly. Your shoulders will be on the table. Your head will hang over the edge of the table. At this point, the side of your head that is causing the worst vertigo will face the floor. You'll stay in this position for 30 seconds or until your symptoms stop. Step 2   1. Then, the doctor or physical therapist will turn your head to the other side. You don't need to lift your head. The other side of your head will face the floor. You will stay in this position for 30 seconds or until your symptoms stop.     Step 3 1. The doctor or physical therapist will help you roll your body in the same direction that your head is facing. You will lie on your side. (For example, if you are looking to your right, you will roll onto your right side.) The side that causes the worst symptoms should be facing up. You'll stay in this position for another 30 seconds or until your symptoms stop. Step 4   1. The doctor or physical therapist will then help you to sit back up. Your legs will hang off the table on the same side that you were facing. Follow-up care is a key part of your treatment and safety. Be sure to make and go to all appointments, and call your doctor if you are having problems. It's also a good idea to know your test results and keep a list of the medicines you take. Where can you learn more? Go to https://Reverbeopepiceweb.[email or web address] org and sign in to your [employer] account. Enter G359 in the LifeScribe box to learn more about \"Epley Maneuver for Vertigo: Exercises. \"     If you do not have an account, please click on the \"Sign Up Now\" link. Current as of: April 15, 2020               Content Version: 12.6  © 4401-1395 [employer]. Care instructions adapted under license by [facility] [place] ([facility]). If you have questions about a medical condition or this instruction, always ask your healthcare professional. [address] any warranty or liability for your use of this information.

## 2021-03-19 ENCOUNTER — OFFICE VISIT (OUTPATIENT)
Dept: PRIMARY CARE CLINIC | Age: 61
End: 2021-03-19
Payer: COMMERCIAL

## 2021-03-19 VITALS
SYSTOLIC BLOOD PRESSURE: 120 MMHG | DIASTOLIC BLOOD PRESSURE: 72 MMHG | HEART RATE: 67 BPM | OXYGEN SATURATION: 97 % | WEIGHT: 208.4 LBS | TEMPERATURE: 97.2 F | BODY MASS INDEX: 40.7 KG/M2

## 2021-03-19 DIAGNOSIS — I10 ESSENTIAL HYPERTENSION: Primary | ICD-10-CM

## 2021-03-19 DIAGNOSIS — F34.1 DYSTHYMIA: ICD-10-CM

## 2021-03-19 PROCEDURE — 99213 OFFICE O/P EST LOW 20 MIN: CPT | Performed by: FAMILY MEDICINE

## 2021-03-19 ASSESSMENT — ENCOUNTER SYMPTOMS
NAUSEA: 0
SHORTNESS OF BREATH: 0
VOMITING: 0
COUGH: 0
EYE REDNESS: 0
DIARRHEA: 0
EYE DISCHARGE: 0
ABDOMINAL PAIN: 0
SORE THROAT: 0
WHEEZING: 0
RHINORRHEA: 0

## 2021-03-19 NOTE — PROGRESS NOTES
[facility] PRIMARY [facility]  [id number] [place] [id number]  Dept: 40 Candy Esqueda is a 56 y.o. female Established patient, who presents today for her medical conditions/complaintsas noted below. Chief Complaint   Patient presents with    Annual Exam    Epistaxis     Patient complains of nose bleeds every morning x 1 week        HPI:     HPI- bloody noses every morning for about 1-2 weeks. Lasts less than 5 minutes. Occas mucous and clots and sometimes just runs out. Using flonase. Doing okay with meds. No refills needed.       Reviewed prior notes None  Reviewed previous Labs    LDL Cholesterol (mg/dL)   Date Value   12/10/2012 74     LDL Calculated (mg/dL)   Date Value   2018 37   2016 88       (goal LDL is <100)   AST (U/L)   Date Value   2020 18     ALT (U/L)   Date Value   2020 20     BUN (mg/dL)   Date Value   2020 16     TSH (mIU/L)   Date Value   2020 2.38     BP Readings from Last 3 Encounters:   21 120/72   21 119/60   20 118/70          (goal 120/80)    Past Medical History:   Diagnosis Date    Alcohol dependence ([facility] [address]     CAD S/P percutaneous coronary angioplasty 12    LAD , ANGELINE    Depression     Dyslipidemia     HTN (hypertension)     Left ventricular diastolic dysfunction 3/8/6468    Obesity     Seborrheic keratoses, inflamed 2016    Skin sensation disturbance 2016      Past Surgical History:   Procedure Laterality Date     SECTION      CORONARY ANGIOPLASTY WITH STENT PLACEMENT      MYRINGOTOMY AND TYMPANOSTOMY TUBE PLACEMENT         Family History   Problem Relation Age of Onset    Other Mother         hypertension    Macular Degen Other         macular degeration    Other Father         hx not known       Social History     Tobacco Use    Smoking status: Former Smoker     Packs/day: 1.00     Years: 40.00     Pack years: 40.00     Types: Cigarettes     Quit date: 2012     Years since quittin.2    Smokeless tobacco: Never Used   Substance Use Topics    Alcohol use: Yes     Alcohol/week: 14.0 standard drinks     Types: 14 Cans of beer per week      Current Outpatient Medications   Medication Sig Dispense Refill    FLUoxetine (PROZAC) 40 MG capsule TAKE 1 CAPSULE DAILY 90 capsule 3    buPROPion (WELLBUTRIN XL) 150 MG extended release tablet Take 1 tablet by mouth every morning 90 tablet 3    atenolol (TENORMIN) 25 MG tablet TAKE 1 TABLET BY MOUTH EVERY DAY 30 tablet 11    dicyclomine (BENTYL) 10 MG capsule Take 1 capsule by mouth every 6 hours as needed (cramping) 90 capsule 0    triamcinolone (KENALOG) 0.147 MG/GM topical spray Apply topically 2 times daily. 100 g 3    desoximetasone (TOPICORT) 0.05 % cream Apply 1 Squirt topically 2 times daily Apply topically 2 times daily.  fluticasone (FLONASE) 50 MCG/ACT nasal spray 1 spray by Nasal route daily      aspirin 325 MG tablet Take 81 mg by mouth daily       clopidogrel (PLAVIX) 75 MG tablet Take 75 mg by mouth daily.  atorvastatin (LIPITOR) 40 MG tablet Take 40 mg by mouth daily.  lisinopril-hydrochlorothiazide (PRINZIDE;ZESTORETIC) 10-12.5 MG per tablet Take 1 tablet by mouth daily.  nitroGLYCERIN (NITROSTAT) 0.4 MG SL tablet Place 0.4 mg under the tongue every 5 minutes as needed.  folic acid-pyridoxine-cyanocobalamine (FOLTX) 2.5-25-1 MG TABS tablet Take 1 tablet by mouth daily       No current facility-administered medications for this visit.       Allergies   Allergen Reactions    Amoxicillin     Trimethoprim     Pcn [Penicillins] Rash    Sulfa Antibiotics Rash       Health Maintenance   Topic Date Due    Cervical cancer screen  Never done    Shingles Vaccine (1 of 2) Never done    Colon cancer screen colonoscopy  Never done    Low dose CT lung screening  Never done    Lipid screen  2019    Breast cancer screen  2020    Potassium monitoring  03/11/2021    Creatinine monitoring  03/11/2021    Flu vaccine (1) 12/18/2021 (Originally 9/1/2020)    COVID-19 Vaccine (2 - Moderna 2-dose series) 04/10/2021    DTaP/Tdap/Td vaccine (2 - Td) 08/10/2022    Hepatitis C screen  Completed    HIV screen  Completed    Hepatitis A vaccine  Aged Out    Hepatitis B vaccine  Aged Out    Hib vaccine  Aged Out    Meningococcal (ACWY) vaccine  Aged Out    Pneumococcal 0-64 years Vaccine  Aged Out       Subjective:      Review of Systems   Constitutional: Negative for chills and fever. HENT: Positive for nosebleeds. Negative for rhinorrhea and sore throat. Eyes: Negative for discharge and redness. Respiratory: Negative for cough, shortness of breath and wheezing. Cardiovascular: Negative for chest pain and palpitations. Gastrointestinal: Negative for abdominal pain, diarrhea, nausea and vomiting. Genitourinary: Negative for dysuria and frequency. Musculoskeletal: Negative for arthralgias and myalgias. Neurological: Positive for dizziness. Negative for light-headedness and headaches. Psychiatric/Behavioral: Negative for sleep disturbance. Memory       Objective:     /72   Pulse 67   Temp 97.2 °F (36.2 °C)   Wt 208 lb 6.4 oz (94.5 kg)   SpO2 97%   BMI 40.70 kg/m²   Physical Exam  Vitals signs and nursing note reviewed. Constitutional:       General: She is not in acute distress. Appearance: She is well-developed. She is not ill-appearing. HENT:      Head: Normocephalic and atraumatic. Right Ear: External ear normal.      Left Ear: External ear normal.   Eyes:      General: No scleral icterus. Right eye: No discharge. Left eye: No discharge. Conjunctiva/sclera: Conjunctivae normal.      Pupils: Pupils are equal, round, and reactive to light. Neck:      Thyroid: No thyromegaly. Trachea: No tracheal deviation. Cardiovascular:      Rate and Rhythm: Normal rate and regular rhythm. Heart sounds: Normal heart sounds. Pulmonary:      Effort: Pulmonary effort is normal. No respiratory distress. Breath sounds: Normal breath sounds. No wheezing. Lymphadenopathy:      Cervical: No cervical adenopathy. Skin:     General: Skin is warm. Findings: No rash. Neurological:      Mental Status: She is alert and oriented to person, place, and time. Psychiatric:         Mood and Affect: Mood normal.         Behavior: Behavior normal.         Thought Content: Thought content normal.         Assessment:       Diagnosis Orders   1. Essential hypertension     2. Dysthymia          Plan:      Return in about 3 months (around 6/19/2021). No orders of the defined types were placed in this encounter. No orders of the defined types were placed in this encounter. Patient given educationalmaterials - see patient instructions. Discussed use, benefit, and side effectsof prescribed medications. All patient questions answered. Pt voiced understanding. Reviewed health maintenance. Instructed to continue current medications, diet andexercise. Patient agreed with treatment plan. Follow up as directed.      Electronicallysigned by Fernando Canales MD on 3/19/2021 at 11:01 AM

## 2021-06-25 ENCOUNTER — HOSPITAL ENCOUNTER (OUTPATIENT)
Age: 61
Setting detail: SPECIMEN
Discharge: HOME | End: 2021-06-25
Payer: COMMERCIAL

## 2021-06-25 ENCOUNTER — OFFICE VISIT (OUTPATIENT)
Dept: PRIMARY CARE CLINIC | Age: 61
End: 2021-06-25
Payer: COMMERCIAL

## 2021-06-25 VITALS
DIASTOLIC BLOOD PRESSURE: 82 MMHG | BODY MASS INDEX: 40.64 KG/M2 | WEIGHT: 207 LBS | HEIGHT: 60 IN | HEART RATE: 90 BPM | SYSTOLIC BLOOD PRESSURE: 128 MMHG | OXYGEN SATURATION: 98 %

## 2021-06-25 DIAGNOSIS — Z12.31 ENCOUNTER FOR SCREENING MAMMOGRAM FOR BREAST CANCER: Primary | ICD-10-CM

## 2021-06-25 DIAGNOSIS — I10 ESSENTIAL HYPERTENSION: ICD-10-CM

## 2021-06-25 DIAGNOSIS — Z13.6 SCREENING FOR CARDIOVASCULAR CONDITION: ICD-10-CM

## 2021-06-25 DIAGNOSIS — Z13.1 SCREENING FOR DIABETES MELLITUS: ICD-10-CM

## 2021-06-25 DIAGNOSIS — Z12.11 SCREENING FOR COLON CANCER: ICD-10-CM

## 2021-06-25 DIAGNOSIS — Z87.891 PERSONAL HISTORY OF TOBACCO USE: ICD-10-CM

## 2021-06-25 LAB
ABSOLUTE EOS #: 0.29 K/UL (ref 0–0.44)
ABSOLUTE IMMATURE GRANULOCYTE: <0.03 K/UL (ref 0–0.3)
ABSOLUTE LYMPH #: 1.61 K/UL (ref 1.1–3.7)
ABSOLUTE MONO #: 1.04 K/UL (ref 0.1–1.2)
ALBUMIN SERPL-MCNC: 3.9 G/DL (ref 3.5–5.2)
ALBUMIN/GLOBULIN RATIO: 1.4 (ref 1–2.5)
ALP BLD-CCNC: 93 U/L (ref 35–104)
ALT SERPL-CCNC: 28 U/L (ref 5–33)
ANION GAP SERPL CALCULATED.3IONS-SCNC: 14 MMOL/L (ref 9–17)
AST SERPL-CCNC: 24 U/L
BASOPHILS # BLD: 1 % (ref 0–2)
BASOPHILS ABSOLUTE: 0.06 K/UL (ref 0–0.2)
BILIRUB SERPL-MCNC: 0.93 MG/DL (ref 0.3–1.2)
BUN BLDV-MCNC: 15 MG/DL (ref 8–23)
BUN/CREAT BLD: NORMAL (ref 9–20)
CALCIUM SERPL-MCNC: 8.9 MG/DL (ref 8.6–10.4)
CHLORIDE BLD-SCNC: 100 MMOL/L (ref 98–107)
CO2: 22 MMOL/L (ref 20–31)
CREAT SERPL-MCNC: 0.72 MG/DL (ref 0.5–0.9)
DIFFERENTIAL TYPE: ABNORMAL
EOSINOPHILS RELATIVE PERCENT: 3 % (ref 1–4)
GFR AFRICAN AMERICAN: >60 ML/MIN
GFR NON-AFRICAN AMERICAN: >60 ML/MIN
GFR SERPL CREATININE-BSD FRML MDRD: NORMAL ML/MIN/{1.73_M2}
GFR SERPL CREATININE-BSD FRML MDRD: NORMAL ML/MIN/{1.73_M2}
GLUCOSE BLD-MCNC: 76 MG/DL (ref 70–99)
HCT VFR BLD CALC: 37.5 % (ref 36.3–47.1)
HEMOGLOBIN: 12.1 G/DL (ref 11.9–15.1)
IMMATURE GRANULOCYTES: 0 %
LYMPHOCYTES # BLD: 17 % (ref 24–43)
MAGNESIUM: 2.3 MG/DL (ref 1.6–2.6)
MCH RBC QN AUTO: 31.3 PG (ref 25.2–33.5)
MCHC RBC AUTO-ENTMCNC: 32.3 G/DL (ref 28.4–34.8)
MCV RBC AUTO: 97.2 FL (ref 82.6–102.9)
MONOCYTES # BLD: 11 % (ref 3–12)
NRBC AUTOMATED: 0 PER 100 WBC
PDW BLD-RTO: 13 % (ref 11.8–14.4)
PLATELET # BLD: 300 K/UL (ref 138–453)
PLATELET ESTIMATE: ABNORMAL
PMV BLD AUTO: 10.7 FL (ref 8.1–13.5)
POTASSIUM SERPL-SCNC: 4.6 MMOL/L (ref 3.7–5.3)
RBC # BLD: 3.86 M/UL (ref 3.95–5.11)
RBC # BLD: ABNORMAL 10*6/UL
SEG NEUTROPHILS: 68 % (ref 36–65)
SEGMENTED NEUTROPHILS ABSOLUTE COUNT: 6.29 K/UL (ref 1.5–8.1)
SODIUM BLD-SCNC: 136 MMOL/L (ref 135–144)
TOTAL PROTEIN: 6.7 G/DL (ref 6.4–8.3)
WBC # BLD: 9.3 K/UL (ref 3.5–11.3)
WBC # BLD: ABNORMAL 10*3/UL

## 2021-06-25 PROCEDURE — 99213 OFFICE O/P EST LOW 20 MIN: CPT | Performed by: FAMILY MEDICINE

## 2021-06-25 PROCEDURE — G0296 VISIT TO DETERM LDCT ELIG: HCPCS | Performed by: FAMILY MEDICINE

## 2021-06-25 ASSESSMENT — ENCOUNTER SYMPTOMS
EYE REDNESS: 0
DIARRHEA: 0
VOMITING: 0
NAUSEA: 0
EYE DISCHARGE: 0
SHORTNESS OF BREATH: 0
ABDOMINAL PAIN: 0
SORE THROAT: 0
COUGH: 0
WHEEZING: 0
RHINORRHEA: 0

## 2021-06-25 NOTE — PROGRESS NOTES
[address] [facility] PRIMARY CARE  [address] [facility] [address]  Dept: 40 Candy Esqueda is a 56 y.o. female Established patient, who presents today for her medical conditions/complaints as noted below. Chief Complaint   Patient presents with    Hypertension     3 month follow up        HPI:     HPI- BP doing okay. No new issues. Had tooth pulled so BP was high then- 169/90. Doing okay with meds. No new issues and no refills needed.       Reviewed prior notes None  Reviewed previous Labs    LDL Cholesterol (mg/dL)   Date Value   12/10/2012 74     LDL Calculated (mg/dL)   Date Value   2018 37   2016 88       (goal LDL is <100)   AST (U/L)   Date Value   2020 18     ALT (U/L)   Date Value   2020 20     BUN (mg/dL)   Date Value   2020 16     TSH (mIU/L)   Date Value   2020 2.38     BP Readings from Last 3 Encounters:   21 128/82   21 120/72   21 119/60          (goal 120/80)    Past Medical History:   Diagnosis Date    Alcohol dependence ([facility] [address]     CAD S/P percutaneous coronary angioplasty 12    LAD , ANGELINE    Depression     Dyslipidemia     HTN (hypertension)     Left ventricular diastolic dysfunction     Obesity     Seborrheic keratoses, inflamed 2016    Skin sensation disturbance 2016      Past Surgical History:   Procedure Laterality Date     SECTION      CORONARY ANGIOPLASTY WITH STENT PLACEMENT      MYRINGOTOMY AND TYMPANOSTOMY TUBE PLACEMENT         Family History   Problem Relation Age of Onset    Other Mother         hypertension    Macular Degen Other         macular degeration    Other Father         hx not known       Social History     Tobacco Use    Smoking status: Former Smoker     Packs/day: 1.00     Years: 40.00     Pack years: 40.00     Types: Cigarettes     Quit date: 2012     Years since quittin.5    Smokeless tobacco: Never Used Substance Use Topics    Alcohol use: Yes     Alcohol/week: 14.0 standard drinks     Types: 14 Cans of beer per week      Current Outpatient Medications   Medication Sig Dispense Refill    FLUoxetine (PROZAC) 40 MG capsule TAKE 1 CAPSULE DAILY 90 capsule 3    buPROPion (WELLBUTRIN XL) 150 MG extended release tablet Take 1 tablet by mouth every morning 90 tablet 3    atenolol (TENORMIN) 25 MG tablet TAKE 1 TABLET BY MOUTH EVERY DAY 30 tablet 11    folic acid-pyridoxine-cyanocobalamine (FOLTX) 2.5-25-1 MG TABS tablet Take 1 tablet by mouth daily      dicyclomine (BENTYL) 10 MG capsule Take 1 capsule by mouth every 6 hours as needed (cramping) 90 capsule 0    triamcinolone (KENALOG) 0.147 MG/GM topical spray Apply topically 2 times daily. 100 g 3    desoximetasone (TOPICORT) 0.05 % cream Apply 1 Squirt topically 2 times daily Apply topically 2 times daily.  fluticasone (FLONASE) 50 MCG/ACT nasal spray 1 spray by Nasal route daily      aspirin 325 MG tablet Take 81 mg by mouth daily       clopidogrel (PLAVIX) 75 MG tablet Take 75 mg by mouth daily.  atorvastatin (LIPITOR) 40 MG tablet Take 40 mg by mouth daily.  lisinopril-hydrochlorothiazide (PRINZIDE;ZESTORETIC) 10-12.5 MG per tablet Take 1 tablet by mouth daily.  nitroGLYCERIN (NITROSTAT) 0.4 MG SL tablet Place 0.4 mg under the tongue every 5 minutes as needed. No current facility-administered medications for this visit.      Allergies   Allergen Reactions    Amoxicillin     Trimethoprim     Pcn [Penicillins] Rash    Sulfa Antibiotics Rash       Health Maintenance   Topic Date Due    Cervical cancer screen  Never done    Shingles Vaccine (1 of 2) Never done    Colon cancer screen colonoscopy  Never done    Low dose CT lung screening  Never done    Lipid screen  08/21/2019    Breast cancer screen  08/21/2020    Potassium monitoring  03/11/2021    Creatinine monitoring  03/11/2021    COVID-19 Vaccine (2 - Moderna 2-dose series) 04/10/2021    Flu vaccine (Season Ended) 12/18/2021 (Originally 9/1/2021)    DTaP/Tdap/Td vaccine (2 - Td or Tdap) 08/10/2022    Hepatitis C screen  Completed    HIV screen  Completed    Hepatitis A vaccine  Aged Out    Hepatitis B vaccine  Aged Out    Hib vaccine  Aged Out    Meningococcal (ACWY) vaccine  Aged Out    Pneumococcal 0-64 years Vaccine  Aged Out       Subjective:      Review of Systems   Constitutional: Negative for chills and fever. HENT: Negative for rhinorrhea and sore throat. Eyes: Negative for discharge and redness. Respiratory: Negative for cough, shortness of breath and wheezing. Cardiovascular: Negative for chest pain and palpitations. Gastrointestinal: Negative for abdominal pain, diarrhea, nausea and vomiting. Genitourinary: Negative for dysuria and frequency. Musculoskeletal: Negative for arthralgias and myalgias. Neurological: Negative for dizziness, light-headedness and headaches. Psychiatric/Behavioral: Negative for sleep disturbance. Objective:     /82   Pulse 90   Ht 5' (1.524 m)   Wt 207 lb (93.9 kg)   SpO2 98%   BMI 40.43 kg/m²   Physical Exam  Vitals and nursing note reviewed. Constitutional:       General: She is not in acute distress. Appearance: She is well-developed. She is not ill-appearing. HENT:      Head: Normocephalic and atraumatic. Right Ear: External ear normal.      Left Ear: External ear normal.   Eyes:      General: No scleral icterus. Right eye: No discharge. Left eye: No discharge. Conjunctiva/sclera: Conjunctivae normal.      Pupils: Pupils are equal, round, and reactive to light. Neck:      Thyroid: No thyromegaly. Trachea: No tracheal deviation. Cardiovascular:      Rate and Rhythm: Normal rate and regular rhythm. Heart sounds: Normal heart sounds. Pulmonary:      Effort: Pulmonary effort is normal. No respiratory distress.       Breath sounds: Normal breath sounds. No wheezing. Lymphadenopathy:      Cervical: No cervical adenopathy. Skin:     General: Skin is warm. Findings: No rash. Neurological:      Mental Status: She is alert and oriented to person, place, and time. Psychiatric:         Mood and Affect: Mood normal.         Behavior: Behavior normal.         Thought Content: Thought content normal.         Assessment/Plan:   1. Encounter for screening mammogram for breast cancer  -     EPIFANIO DIGITAL SCREEN W OR WO CAD BILATERAL; Future  2. Screening for diabetes mellitus  -     CBC Auto Differential; Future  -     Comprehensive Metabolic Panel; Future  -     Magnesium; Future  3. Screening for cardiovascular condition  -     CBC Auto Differential; Future  -     Comprehensive Metabolic Panel; Future  -     Magnesium; Future  4. Essential hypertension  Assessment & Plan:   Well-controlled, continue current medications  Orders:  -     CBC Auto Differential; Future  -     Comprehensive Metabolic Panel; Future  -     Magnesium; Future  5. Screening for colon cancer  -     [facility]; [facility]  6. Personal history of tobacco use       Return in about 6 months (around 12/25/2021) for HTN f/u. Orders Placed This Encounter   Procedures    Cologuard     This test is performed by an external laboratory and is used for result attachment only. Please fill out the appropriate paperwork required by the processing laboratory. See [email or web address] for further information.      Standing Status:   Future     Standing Expiration Date:   6/25/2022    EPIFANIO DIGITAL SCREEN W OR WO CAD BILATERAL     Standing Status:   Future     Standing Expiration Date:   8/25/2022    CBC Auto Differential     Standing Status:   Future     Standing Expiration Date:   6/26/2022    Comprehensive Metabolic Panel     Standing Status:   Future     Standing Expiration Date:   6/26/2022    Magnesium     Standing Status:   Future     Standing Expiration Date:   6/25/2022     No orders of the defined types were placed in this encounter. Patient given educational materials - see patient instructions. Discussed use, benefit, and side effects of prescribed medications. All patient questions answered. Pt voiced understanding. Reviewed health maintenance. Instructed to continue current medications, diet and exercise. Patient agreed with treatment plan. Follow up as directed. Electronically signed by Chilango Reeder MD on 6/25/2021 at 10:02 AM    Low Dose CT (LDCT) Lung Screening criteria met   Age 50-69   Pack year smoking >30   Still smoking or less than 15 year since quit   No sign or symptoms of lung cancer   > 11 months since last LDCT     Risks and benefits of lung cancer screening with LDCT scans discussed:    Significance of positive screen - False-positive LDCT results often occur. 95% of all positive results do not lead to a diagnosis of cancer. Usually further imaging can resolve most false-positive results; however, some patients may require invasive procedures. Over diagnosis risk - 10% to 12% of screen-detected lung cancer cases are over diagnosedthat is, the cancer would not have been detected in the patient's lifetime without the screening. Need for follow up screens annually to continue lung cancer screening effectiveness     Risks associated with radiation from annual LDCT- Radiation exposure is about the same as for a mammogram, which is about 1/3 of the annual background radiation exposure from everyday life. Starting screening at age 54 is not likely to increase cancer risk from radiation exposure. Patients with comorbidities resulting in life expectancy of < 10 years, or that would preclude treatment of an abnormality identified on CT, should not be screened due to lack of benefit.     To obtain maximal benefit from this screening, smoking cessation and long-term abstinence from smoking is critical

## 2021-07-08 ENCOUNTER — HOSPITAL ENCOUNTER (OUTPATIENT)
Dept: CT IMAGING | Age: 61
Discharge: HOME | End: 2021-07-10
Payer: COMMERCIAL

## 2021-07-08 ENCOUNTER — HOSPITAL ENCOUNTER (OUTPATIENT)
Dept: WOMENS IMAGING | Age: 61
Discharge: HOME | End: 2021-07-10
Payer: COMMERCIAL

## 2021-07-08 DIAGNOSIS — Z12.31 ENCOUNTER FOR SCREENING MAMMOGRAM FOR BREAST CANCER: ICD-10-CM

## 2021-07-08 DIAGNOSIS — Z87.891 PERSONAL HISTORY OF TOBACCO USE: ICD-10-CM

## 2021-07-08 PROCEDURE — 71271 CT THORAX LUNG CANCER SCR C-: CPT

## 2021-07-08 PROCEDURE — 77063 BREAST TOMOSYNTHESIS BI: CPT

## 2021-07-27 DIAGNOSIS — Z12.11 SCREENING FOR COLON CANCER: ICD-10-CM

## 2021-12-01 ENCOUNTER — E-VISIT (OUTPATIENT)
Dept: PRIMARY CARE CLINIC | Age: 61
End: 2021-12-01

## 2021-12-01 ENCOUNTER — NURSE ONLY (OUTPATIENT)
Dept: PRIMARY CARE CLINIC | Age: 61
End: 2021-12-01
Payer: COMMERCIAL

## 2021-12-01 DIAGNOSIS — R05.9 COUGH: ICD-10-CM

## 2021-12-01 DIAGNOSIS — Z11.52 ENCOUNTER FOR SCREENING FOR COVID-19: Primary | ICD-10-CM

## 2021-12-01 LAB
INFLUENZA A ANTIBODY: NEGATIVE
INFLUENZA B ANTIBODY: NEGATIVE

## 2021-12-01 PROCEDURE — 87804 INFLUENZA ASSAY W/OPTIC: CPT | Performed by: PHYSICIAN ASSISTANT

## 2021-12-01 ASSESSMENT — LIFESTYLE VARIABLES
SMOKING_YEARS: 40
PACKS_PER_DAY: 1
SMOKING_STATUS: NO, I'M A FORMER SMOKER

## 2021-12-01 NOTE — PROGRESS NOTES
Patient is here today to get a covid and flu swab. Patient tolerated the swabs well. Patient was notified that the covid results will be back between 1-2 business days and the office will contact her with results.

## 2021-12-02 ENCOUNTER — PATIENT MESSAGE (OUTPATIENT)
Dept: PRIMARY CARE CLINIC | Age: 61
End: 2021-12-02

## 2021-12-02 DIAGNOSIS — J40 BRONCHITIS: Primary | ICD-10-CM

## 2021-12-02 LAB
SARS-COV-2: NORMAL
SARS-COV-2: NOT DETECTED
SOURCE: NORMAL

## 2021-12-02 RX ORDER — AZITHROMYCIN 250 MG/1
TABLET, FILM COATED ORAL
Qty: 1 PACKET | Refills: 0 | Status: SHIPPED | OUTPATIENT
Start: 2021-12-02 | End: 2021-12-17

## 2021-12-17 ENCOUNTER — OFFICE VISIT (OUTPATIENT)
Dept: PRIMARY CARE CLINIC | Age: 61
End: 2021-12-17
Payer: COMMERCIAL

## 2021-12-17 VITALS
DIASTOLIC BLOOD PRESSURE: 78 MMHG | HEART RATE: 57 BPM | SYSTOLIC BLOOD PRESSURE: 124 MMHG | OXYGEN SATURATION: 99 % | BODY MASS INDEX: 39.93 KG/M2 | WEIGHT: 203.4 LBS | HEIGHT: 60 IN

## 2021-12-17 DIAGNOSIS — Z98.61 CAD S/P PERCUTANEOUS CORONARY ANGIOPLASTY: ICD-10-CM

## 2021-12-17 DIAGNOSIS — I25.10 CAD S/P PERCUTANEOUS CORONARY ANGIOPLASTY: ICD-10-CM

## 2021-12-17 DIAGNOSIS — I10 PRIMARY HYPERTENSION: ICD-10-CM

## 2021-12-17 PROCEDURE — 99213 OFFICE O/P EST LOW 20 MIN: CPT | Performed by: FAMILY MEDICINE

## 2021-12-17 ASSESSMENT — ENCOUNTER SYMPTOMS
VOMITING: 0
NAUSEA: 0
COUGH: 1
CHEST TIGHTNESS: 1
SHORTNESS OF BREATH: 0
DIARRHEA: 0

## 2021-12-17 NOTE — PROGRESS NOTES
[facility] PRIMARY CARE  [address] [facility] [address]  Dept: 40 Candy Esqueda is a 59 y.o. female Established patient, who presents today for her medical conditions/complaints as noted below. Chief Complaint   Patient presents with    6 Month Follow-Up     6 month HTN f/u       HPI:     HPI- pt still with cough and sinus drainage. Some headache. Taking tylenol. No issues with meds. Seems to be working okay. No refills needed right now. Has not been as hungry as she was and has been moving more.       Reviewed prior notes None  Reviewed previous Labs    LDL Cholesterol (mg/dL)   Date Value   12/10/2012 74     LDL Calculated (mg/dL)   Date Value   2018 37   2016 88       (goal LDL is <100)   AST (U/L)   Date Value   2021 24     ALT (U/L)   Date Value   2021 28     BUN (mg/dL)   Date Value   2021 15     TSH (mIU/L)   Date Value   2020 2.38     BP Readings from Last 3 Encounters:   21 124/78   21 128/82   21 120/72          (goal 120/80)    Past Medical History:   Diagnosis Date    Alcohol dependence ([facility] [address]     CAD S/P percutaneous coronary angioplasty 12    LAD , ANGELINE    Depression     Dyslipidemia     HTN (hypertension)     Left ventricular diastolic dysfunction 7413    Obesity     Seborrheic keratoses, inflamed 2016    Skin sensation disturbance 2016      Past Surgical History:   Procedure Laterality Date     SECTION      CORONARY ANGIOPLASTY WITH STENT PLACEMENT      MYRINGOTOMY AND TYMPANOSTOMY TUBE PLACEMENT         Family History   Problem Relation Age of Onset    Other Mother         hypertension    Macular Degen Other         macular degeration    Other Father         hx not known       Social History     Tobacco Use    Smoking status: Former Smoker     Packs/day: 1.00     Years: 40.00     Pack years: 40.00     Types: Cigarettes     Quit date: 2012     Years since quittin.0    Smokeless tobacco: Never Used   Substance Use Topics    Alcohol use: Yes     Alcohol/week: 14.0 standard drinks     Types: 14 Cans of beer per week      Current Outpatient Medications   Medication Sig Dispense Refill    FLUoxetine (PROZAC) 40 MG capsule TAKE 1 CAPSULE DAILY 90 capsule 3    buPROPion (WELLBUTRIN XL) 150 MG extended release tablet Take 1 tablet by mouth every morning 90 tablet 3    atenolol (TENORMIN) 25 MG tablet TAKE 1 TABLET BY MOUTH EVERY DAY 30 tablet 11    dicyclomine (BENTYL) 10 MG capsule Take 1 capsule by mouth every 6 hours as needed (cramping) 90 capsule 0    triamcinolone (KENALOG) 0.147 MG/GM topical spray Apply topically 2 times daily. 100 g 3    desoximetasone (TOPICORT) 0.05 % cream Apply 1 Squirt topically 2 times daily Apply topically 2 times daily.  fluticasone (FLONASE) 50 MCG/ACT nasal spray 1 spray by Nasal route daily      aspirin 325 MG tablet Take 81 mg by mouth daily       clopidogrel (PLAVIX) 75 MG tablet Take 75 mg by mouth daily.  atorvastatin (LIPITOR) 40 MG tablet Take 40 mg by mouth daily.  lisinopril-hydrochlorothiazide (PRINZIDE;ZESTORETIC) 10-12.5 MG per tablet Take 1 tablet by mouth daily.  nitroGLYCERIN (NITROSTAT) 0.4 MG SL tablet Place 0.4 mg under the tongue every 5 minutes as needed. No current facility-administered medications for this visit.      Allergies   Allergen Reactions    Amoxicillin     Trimethoprim     Pcn [Penicillins] Rash    Sulfa Antibiotics Rash       Health Maintenance   Topic Date Due    Cervical cancer screen  Never done    Shingles Vaccine (1 of 2) Never done    Lipid screen  2019    Flu vaccine (1) 2021    COVID-19 Vaccine (3 - Booster for Moderna series) 10/10/2021    Potassium monitoring  2022    Creatinine monitoring  2022    Low dose CT lung screening  2022    DTaP/Tdap/Td vaccine (2 - Td or Tdap) 08/10/2022  Breast cancer screen  07/08/2023    Colon cancer screen fecal DNA test (Cologuard)  07/20/2024    Hepatitis C screen  Completed    HIV screen  Completed    Hepatitis A vaccine  Aged Out    Hepatitis B vaccine  Aged Out    Hib vaccine  Aged Out    Meningococcal (ACWY) vaccine  Aged Out    Pneumococcal 0-64 years Vaccine  Aged Out       Subjective:      Review of Systems   Constitutional: Negative for chills and fever. HENT: Negative for congestion. Respiratory: Positive for cough and chest tightness (occas). Negative for shortness of breath. Gastrointestinal: Negative for diarrhea, nausea and vomiting. Neurological: Positive for light-headedness (occas if turns head too fast). Negative for dizziness. Psychiatric/Behavioral: Negative for sleep disturbance. Objective:     /78   Pulse 57   Ht 5' (1.524 m)   Wt 203 lb 6.4 oz (92.3 kg)   SpO2 99%   BMI 39.72 kg/m²   Physical Exam  Vitals and nursing note reviewed. Constitutional:       General: She is not in acute distress. Appearance: She is well-developed. She is not ill-appearing. HENT:      Head: Normocephalic and atraumatic. Right Ear: External ear normal.      Left Ear: External ear normal.   Eyes:      General: No scleral icterus. Right eye: No discharge. Left eye: No discharge. Conjunctiva/sclera: Conjunctivae normal.      Pupils: Pupils are equal, round, and reactive to light. Neck:      Thyroid: No thyromegaly. Trachea: No tracheal deviation. Cardiovascular:      Rate and Rhythm: Normal rate and regular rhythm. Heart sounds: Normal heart sounds. Pulmonary:      Effort: Pulmonary effort is normal. No respiratory distress. Breath sounds: Normal breath sounds. No wheezing. Lymphadenopathy:      Cervical: No cervical adenopathy. Skin:     General: Skin is warm. Findings: No rash.    Neurological:      Mental Status: She is alert and oriented to person, place, and time.   Psychiatric:         Mood and Affect: Mood normal.         Behavior: Behavior normal.         Thought Content: Thought content normal.         Assessment/Plan:   1. Primary hypertension  Assessment & Plan:   Well-controlled, continue current medications  2. CAD S/P percutaneous coronary angioplasty  Assessment & Plan:   Monitored by specialist- no acute findings meriting change in the plan       Return in about 6 months (around 6/17/2022) for HTN f/u. No orders of the defined types were placed in this encounter. No orders of the defined types were placed in this encounter. Patient given educational materials - see patient instructions. Discussed use, benefit, and side effects of prescribed medications. All patient questions answered. Pt voiced understanding. Reviewed health maintenance. Instructed to continue current medications, diet and exercise. Patient agreed with treatment plan. Follow up as directed.      Electronically signed by Yaneth Weber MD on 12/17/2021 at 10:27 AM

## 2021-12-27 ENCOUNTER — OFFICE VISIT (OUTPATIENT)
Dept: PRIMARY CARE CLINIC | Age: 61
End: 2021-12-27
Payer: COMMERCIAL

## 2021-12-27 ENCOUNTER — HOSPITAL ENCOUNTER (OUTPATIENT)
Age: 61
Setting detail: SPECIMEN
Discharge: HOME | End: 2021-12-27

## 2021-12-27 VITALS
HEART RATE: 73 BPM | TEMPERATURE: 98.2 F | DIASTOLIC BLOOD PRESSURE: 62 MMHG | BODY MASS INDEX: 39.06 KG/M2 | SYSTOLIC BLOOD PRESSURE: 110 MMHG | WEIGHT: 200 LBS | OXYGEN SATURATION: 98 %

## 2021-12-27 DIAGNOSIS — R05.9 COUGH: ICD-10-CM

## 2021-12-27 LAB
INFLUENZA A ANTIBODY: NORMAL
INFLUENZA B ANTIBODY: NORMAL

## 2021-12-27 PROCEDURE — 87804 INFLUENZA ASSAY W/OPTIC: CPT | Performed by: FAMILY MEDICINE

## 2021-12-27 PROCEDURE — 99213 OFFICE O/P EST LOW 20 MIN: CPT | Performed by: FAMILY MEDICINE

## 2021-12-27 RX ORDER — BENZONATATE 100 MG/1
100 CAPSULE ORAL 3 TIMES DAILY PRN
Qty: 30 CAPSULE | Refills: 0 | Status: SHIPPED | OUTPATIENT
Start: 2021-12-27 | End: 2022-01-06

## 2021-12-27 RX ORDER — BUPROPION HYDROCHLORIDE 150 MG/1
TABLET ORAL
Qty: 90 TABLET | Refills: 3 | Status: SHIPPED | OUTPATIENT
Start: 2021-12-27

## 2021-12-27 RX ORDER — FLUOXETINE HYDROCHLORIDE 40 MG/1
CAPSULE ORAL
Qty: 90 CAPSULE | Refills: 3 | Status: SHIPPED | OUTPATIENT
Start: 2021-12-27

## 2021-12-27 SDOH — ECONOMIC STABILITY: FOOD INSECURITY: WITHIN THE PAST 12 MONTHS, YOU WORRIED THAT YOUR FOOD WOULD RUN OUT BEFORE YOU GOT MONEY TO BUY MORE.: NEVER TRUE

## 2021-12-27 SDOH — ECONOMIC STABILITY: FOOD INSECURITY: WITHIN THE PAST 12 MONTHS, THE FOOD YOU BOUGHT JUST DIDN'T LAST AND YOU DIDN'T HAVE MONEY TO GET MORE.: NEVER TRUE

## 2021-12-27 ASSESSMENT — ENCOUNTER SYMPTOMS
DIARRHEA: 0
SHORTNESS OF BREATH: 1
COUGH: 1
RHINORRHEA: 1
VOMITING: 0
SORE THROAT: 0
NAUSEA: 0
SINUS PRESSURE: 1

## 2021-12-27 ASSESSMENT — SOCIAL DETERMINANTS OF HEALTH (SDOH): HOW HARD IS IT FOR YOU TO PAY FOR THE VERY BASICS LIKE FOOD, HOUSING, MEDICAL CARE, AND HEATING?: NOT HARD AT ALL

## 2021-12-27 NOTE — PROGRESS NOTES
[facility] PRIMARY CARE  [address] [facility] [address]  Dept: 40 Candy Esqueda is a 59 y.o. female Established patient, who presents today for her medical conditions/complaints as noted below. Chief Complaint   Patient presents with    Cough     x 2 days. Cough is dry most of the time    Sinus Problem    Otalgia     Bilateral ear pain        HPI:     HPI- no fevers, but is achey all over.    Has taken delsym for cough and tried Mucinex D    Reviewed prior notes None  Reviewed previous Labs    LDL Cholesterol (mg/dL)   Date Value   12/10/2012 74     LDL Calculated (mg/dL)   Date Value   2018 37   2016 88       (goal LDL is <100)   AST (U/L)   Date Value   2021 24     ALT (U/L)   Date Value   2021 28     BUN (mg/dL)   Date Value   2021 15     TSH (mIU/L)   Date Value   2020 2.38     BP Readings from Last 3 Encounters:   21 110/62   21 124/78   21 128/82          (goal 120/80)    Past Medical History:   Diagnosis Date    Alcohol dependence ([address]     CAD S/P percutaneous coronary angioplasty 12    LAD , ANGELINE    Depression     Dyslipidemia     HTN (hypertension)     Left ventricular diastolic dysfunction 9199    Obesity     Seborrheic keratoses, inflamed 2016    Skin sensation disturbance 2016      Past Surgical History:   Procedure Laterality Date     SECTION      CORONARY ANGIOPLASTY WITH STENT PLACEMENT      MYRINGOTOMY AND TYMPANOSTOMY TUBE PLACEMENT         Family History   Problem Relation Age of Onset    Other Mother         hypertension    Macular Degen Other         macular degeration    Other Father         hx not known       Social History     Tobacco Use    Smoking status: Former Smoker     Packs/day: 1.00     Years: 40.00     Pack years: 40.00     Types: Cigarettes     Quit date: 2012     Years since quittin.0    Smokeless tobacco: Never Used   Substance Use Topics    Alcohol use: Yes     Alcohol/week: 14.0 standard drinks     Types: 14 Cans of beer per week      Current Outpatient Medications   Medication Sig Dispense Refill    buPROPion (WELLBUTRIN XL) 150 MG extended release tablet TAKE 1 TABLET BY MOUTH EVERY DAY IN THE MORNING 90 tablet 3    FLUoxetine (PROZAC) 40 MG capsule TAKE 1 CAPSULE BY MOUTH EVERY DAY 90 capsule 3    benzonatate (TESSALON PERLES) 100 MG capsule Take 1 capsule by mouth 3 times daily as needed for Cough 30 capsule 0    atenolol (TENORMIN) 25 MG tablet TAKE 1 TABLET BY MOUTH EVERY DAY 30 tablet 11    dicyclomine (BENTYL) 10 MG capsule Take 1 capsule by mouth every 6 hours as needed (cramping) 90 capsule 0    triamcinolone (KENALOG) 0.147 MG/GM topical spray Apply topically 2 times daily. 100 g 3    desoximetasone (TOPICORT) 0.05 % cream Apply 1 Squirt topically 2 times daily Apply topically 2 times daily.  fluticasone (FLONASE) 50 MCG/ACT nasal spray 1 spray by Nasal route daily      aspirin 325 MG tablet Take 81 mg by mouth daily       clopidogrel (PLAVIX) 75 MG tablet Take 75 mg by mouth daily.  atorvastatin (LIPITOR) 40 MG tablet Take 40 mg by mouth daily.  lisinopril-hydrochlorothiazide (PRINZIDE;ZESTORETIC) 10-12.5 MG per tablet Take 1 tablet by mouth daily.  nitroGLYCERIN (NITROSTAT) 0.4 MG SL tablet Place 0.4 mg under the tongue every 5 minutes as needed. No current facility-administered medications for this visit.      Allergies   Allergen Reactions    Amoxicillin     Trimethoprim     Pcn [Penicillins] Rash    Sulfa Antibiotics Rash       Health Maintenance   Topic Date Due    Cervical cancer screen  Never done    Shingles Vaccine (1 of 2) Never done    Lipid screen  08/21/2019    Flu vaccine (1) 09/01/2021    COVID-19 Vaccine (3 - Booster for Moderna series) 10/10/2021    Potassium monitoring  06/25/2022    Creatinine monitoring  06/25/2022    Low dose CT lung screening  07/08/2022    DTaP/Tdap/Td vaccine (2 - Td or Tdap) 08/10/2022    Breast cancer screen  07/08/2023    Colon cancer screen fecal DNA test (Cologuard)  07/20/2024    Hepatitis C screen  Completed    HIV screen  Completed    Hepatitis A vaccine  Aged Out    Hepatitis B vaccine  Aged Out    Hib vaccine  Aged Out    Meningococcal (ACWY) vaccine  Aged Out    Pneumococcal 0-64 years Vaccine  Aged Out       Subjective:      Review of Systems   Constitutional: Positive for chills and diaphoresis. Negative for fever. HENT: Positive for congestion, ear pain, postnasal drip, rhinorrhea and sinus pressure. Negative for sore throat. Respiratory: Positive for cough and shortness of breath (some). Gastrointestinal: Negative for diarrhea, nausea and vomiting. Neurological: Positive for headaches. Psychiatric/Behavioral: Negative for sleep disturbance. Objective:     /62   Pulse 73   Temp 98.2 °F (36.8 °C)   Wt 200 lb (90.7 kg)   SpO2 98%   BMI 39.06 kg/m²   Physical Exam  Vitals and nursing note reviewed. Constitutional:       General: She is not in acute distress. Appearance: She is well-developed. She is not ill-appearing. HENT:      Head: Normocephalic and atraumatic. Right Ear: External ear normal.      Left Ear: External ear normal.      Ears:      Comments: Fluid bilateral ears  Eyes:      General: No scleral icterus. Right eye: No discharge. Left eye: No discharge. Conjunctiva/sclera: Conjunctivae normal.      Pupils: Pupils are equal, round, and reactive to light. Neck:      Thyroid: No thyromegaly. Trachea: No tracheal deviation. Cardiovascular:      Rate and Rhythm: Normal rate and regular rhythm. Heart sounds: Normal heart sounds. Pulmonary:      Effort: Pulmonary effort is normal. No respiratory distress. Breath sounds: Normal breath sounds. No wheezing. Lymphadenopathy:      Cervical: No cervical adenopathy. Skin:     General: Skin is warm. Findings: No rash. Neurological:      Mental Status: She is alert and oriented to person, place, and time. Psychiatric:         Mood and Affect: Mood normal.         Behavior: Behavior normal.         Thought Content: Thought content normal.         Assessment/Plan:   1. Cough  -     COVID-19; Future  -     POCT Influenza A/B       Return if symptoms worsen or fail to improve. Orders Placed This Encounter   Procedures    COVID-19     Standing Status:   Future     Standing Expiration Date:   12/27/2022     Scheduling Instructions:      1) Due to current limited availability of the COVID-19 test, tests will be prioritized based on responses to questions above. Testing may be delayed due to volume. 2) Print and instruct patient to adhere to CDC home isolation program. (Link Above)              3) Set up or refer patient for a monitoring program.              4) Have patient sign up for and leverage MyChart (if not previously done). Order Specific Question:   Is this test for diagnosis or screening? Answer:   Diagnosis of ill patient     Order Specific Question:   Symptomatic for COVID-19 as defined by CDC? Answer:   Yes     Order Specific Question:   Date of Symptom Onset     Answer:   12/25/2021     Order Specific Question:   Hospitalized for COVID-19? Answer:   No     Order Specific Question:   Admitted to ICU for COVID-19? Answer:   No     Order Specific Question:   Employed in healthcare setting? Answer:   No     Order Specific Question:   Resident in a congregate (group) care setting? Answer:   No     Order Specific Question:   Pregnant? Answer:   No     Order Specific Question:   Previously tested for COVID-19? Answer:    Yes    POCT Influenza A/B     Orders Placed This Encounter   Medications    benzonatate (TESSALON PERLES) 100 MG capsule     Sig: Take 1 capsule by mouth 3 times daily as needed for Cough Dispense:  30 capsule     Refill:  0       Patient given educational materials - see patient instructions. Discussed use, benefit, and side effects of prescribed medications. All patient questions answered. Pt voiced understanding. Reviewed health maintenance. Instructed to continue current medications, diet and exercise. Patient agreed with treatment plan. Follow up as directed.      Electronically signed by Vlaery Adam MD on 12/27/2021 at 4:09 PM

## 2021-12-28 DIAGNOSIS — R05.9 COUGH: ICD-10-CM

## 2021-12-29 LAB
SARS-COV-2: ABNORMAL
SARS-COV-2: DETECTED
SOURCE: ABNORMAL

## 2022-01-20 RX ORDER — ATENOLOL 25 MG/1
TABLET ORAL
Qty: 90 TABLET | Refills: 3 | Status: SHIPPED | OUTPATIENT
Start: 2022-01-20

## 2022-01-26 PROBLEM — R05.9 COUGH: Status: RESOLVED | Noted: 2021-12-27 | Resolved: 2022-01-26

## 2022-03-28 ENCOUNTER — OFFICE VISIT (OUTPATIENT)
Dept: PRIMARY CARE CLINIC | Age: 62
End: 2022-03-28
Payer: COMMERCIAL

## 2022-03-28 VITALS
SYSTOLIC BLOOD PRESSURE: 116 MMHG | BODY MASS INDEX: 39.3 KG/M2 | HEART RATE: 66 BPM | HEIGHT: 60 IN | DIASTOLIC BLOOD PRESSURE: 60 MMHG | WEIGHT: 200.2 LBS | OXYGEN SATURATION: 95 %

## 2022-03-28 DIAGNOSIS — R05.9 COUGH: Primary | ICD-10-CM

## 2022-03-28 PROCEDURE — 99214 OFFICE O/P EST MOD 30 MIN: CPT | Performed by: PHYSICIAN ASSISTANT

## 2022-03-28 RX ORDER — PREDNISONE 20 MG/1
20 TABLET ORAL 2 TIMES DAILY
Qty: 10 TABLET | Refills: 0 | Status: SHIPPED | OUTPATIENT
Start: 2022-03-28 | End: 2022-04-02

## 2022-03-28 RX ORDER — ALBUTEROL SULFATE 90 UG/1
2 AEROSOL, METERED RESPIRATORY (INHALATION) 4 TIMES DAILY PRN
Qty: 18 G | Refills: 0 | Status: SHIPPED | OUTPATIENT
Start: 2022-03-28 | End: 2022-04-19

## 2022-03-28 RX ORDER — BENZONATATE 100 MG/1
100 CAPSULE ORAL 3 TIMES DAILY PRN
Qty: 30 CAPSULE | Refills: 0 | Status: SHIPPED | OUTPATIENT
Start: 2022-03-28 | End: 2022-04-07

## 2022-03-28 RX ORDER — AZITHROMYCIN 250 MG/1
TABLET, FILM COATED ORAL
Qty: 1 PACKET | Refills: 0 | Status: SHIPPED | OUTPATIENT
Start: 2022-03-28 | End: 2022-04-07

## 2022-03-28 ASSESSMENT — PATIENT HEALTH QUESTIONNAIRE - PHQ9
5. POOR APPETITE OR OVEREATING: 0
SUM OF ALL RESPONSES TO PHQ QUESTIONS 1-9: 0
SUM OF ALL RESPONSES TO PHQ9 QUESTIONS 1 & 2: 0
7. TROUBLE CONCENTRATING ON THINGS, SUCH AS READING THE NEWSPAPER OR WATCHING TELEVISION: 0
4. FEELING TIRED OR HAVING LITTLE ENERGY: 0
3. TROUBLE FALLING OR STAYING ASLEEP: 0
8. MOVING OR SPEAKING SO SLOWLY THAT OTHER PEOPLE COULD HAVE NOTICED. OR THE OPPOSITE, BEING SO FIGETY OR RESTLESS THAT YOU HAVE BEEN MOVING AROUND A LOT MORE THAN USUAL: 0
1. LITTLE INTEREST OR PLEASURE IN DOING THINGS: 0
6. FEELING BAD ABOUT YOURSELF - OR THAT YOU ARE A FAILURE OR HAVE LET YOURSELF OR YOUR FAMILY DOWN: 0
SUM OF ALL RESPONSES TO PHQ QUESTIONS 1-9: 0
9. THOUGHTS THAT YOU WOULD BE BETTER OFF DEAD, OR OF HURTING YOURSELF: 0
10. IF YOU CHECKED OFF ANY PROBLEMS, HOW DIFFICULT HAVE THESE PROBLEMS MADE IT FOR YOU TO DO YOUR WORK, TAKE CARE OF THINGS AT HOME, OR GET ALONG WITH OTHER PEOPLE: 0
2. FEELING DOWN, DEPRESSED OR HOPELESS: 0

## 2022-03-28 ASSESSMENT — ENCOUNTER SYMPTOMS
SHORTNESS OF BREATH: 0
EYE DISCHARGE: 0
RHINORRHEA: 0
SORE THROAT: 0
CONSTIPATION: 0
ABDOMINAL DISTENTION: 0
SINUS PRESSURE: 1
COUGH: 1
ABDOMINAL PAIN: 0
CHEST TIGHTNESS: 0
PHOTOPHOBIA: 0
NAUSEA: 1
VOMITING: 0
DIARRHEA: 0

## 2022-03-28 NOTE — PROGRESS NOTES
[address] [facility] PRIMARY CARE  [address] [facility] [address]  Dept: 40 Candy Esqueda is a 59 y.o. female Established patient, who presents today for her medical conditions/complaints as noted below. Chief Complaint   Patient presents with    Cough     Pt stating 5 days its been going on ,     Congestion       HPI:     HPI: The patient has a cough and it is productive. Had a fever. Some fatigue and body aches. Has allergies.      Reviewed prior notes None  Reviewed previous Labs    LDL Cholesterol (mg/dL)   Date Value   12/10/2012 74     LDL Calculated (mg/dL)   Date Value   2018 37   2016 88       (goal LDL is <100)   AST (U/L)   Date Value   2021 24     ALT (U/L)   Date Value   2021 28     BUN (mg/dL)   Date Value   2021 15     TSH (mIU/L)   Date Value   2020 2.38     BP Readings from Last 3 Encounters:   22 116/60   21 110/62   21 124/78          (goal 120/80)    Past Medical History:   Diagnosis Date    Alcohol dependence ([facility] [address]     CAD S/P percutaneous coronary angioplasty 12    LAD , ANGELINE    Depression     Dyslipidemia     HTN (hypertension)     Left ventricular diastolic dysfunction 9829    Obesity     Seborrheic keratoses, inflamed 2016    Skin sensation disturbance 2016      Past Surgical History:   Procedure Laterality Date     SECTION      CORONARY ANGIOPLASTY WITH STENT PLACEMENT      MYRINGOTOMY AND TYMPANOSTOMY TUBE PLACEMENT         Family History   Problem Relation Age of Onset    Other Mother         hypertension    Macular Degen Other         macular degeration    Other Father         hx not known       Social History     Tobacco Use    Smoking status: Former Smoker     Packs/day: 1.00     Years: 40.00     Pack years: 40.00     Types: Cigarettes     Quit date: 2012     Years since quittin.3    Smokeless tobacco: Never Used   Substance Use Topics    Alcohol use: Yes     Alcohol/week: 14.0 standard drinks     Types: 14 Cans of beer per week      Current Outpatient Medications   Medication Sig Dispense Refill    predniSONE (DELTASONE) 20 MG tablet Take 1 tablet by mouth 2 times daily for 5 days 10 tablet 0    azithromycin (ZITHROMAX) 250 MG tablet 2 tablets now then 1 daily until gone. 1 packet 0    albuterol sulfate HFA (VENTOLIN HFA) 108 (90 Base) MCG/ACT inhaler Inhale 2 puffs into the lungs 4 times daily as needed for Wheezing 18 g 0    benzonatate (TESSALON PERLES) 100 MG capsule Take 1 capsule by mouth 3 times daily as needed for Cough 30 capsule 0    atenolol (TENORMIN) 25 MG tablet TAKE 1 TABLET BY MOUTH EVERY DAY 90 tablet 3    buPROPion (WELLBUTRIN XL) 150 MG extended release tablet TAKE 1 TABLET BY MOUTH EVERY DAY IN THE MORNING 90 tablet 3    FLUoxetine (PROZAC) 40 MG capsule TAKE 1 CAPSULE BY MOUTH EVERY DAY 90 capsule 3    dicyclomine (BENTYL) 10 MG capsule Take 1 capsule by mouth every 6 hours as needed (cramping) 90 capsule 0    triamcinolone (KENALOG) 0.147 MG/GM topical spray Apply topically 2 times daily. 100 g 3    desoximetasone (TOPICORT) 0.05 % cream Apply 1 Squirt topically 2 times daily Apply topically 2 times daily.  fluticasone (FLONASE) 50 MCG/ACT nasal spray 1 spray by Nasal route daily      aspirin 325 MG tablet Take 81 mg by mouth daily       clopidogrel (PLAVIX) 75 MG tablet Take 75 mg by mouth daily.  atorvastatin (LIPITOR) 40 MG tablet Take 40 mg by mouth daily.  lisinopril-hydrochlorothiazide (PRINZIDE;ZESTORETIC) 10-12.5 MG per tablet Take 1 tablet by mouth daily.  nitroGLYCERIN (NITROSTAT) 0.4 MG SL tablet Place 0.4 mg under the tongue every 5 minutes as needed. No current facility-administered medications for this visit.      Allergies   Allergen Reactions    Amoxicillin     Trimethoprim     Pcn [Penicillins] Rash    Sulfa Antibiotics Rash       Health Maintenance Topic Date Due    Depression Monitoring  Never done    Cervical cancer screen  Never done    Shingles Vaccine (1 of 2) Never done    Lipid screen  08/21/2019    Flu vaccine (1) 09/01/2021    COVID-19 Vaccine (3 - Booster for Moderna series) 09/10/2021    Potassium monitoring  06/25/2022    Creatinine monitoring  06/25/2022    Low dose CT lung screening  07/08/2022    DTaP/Tdap/Td vaccine (2 - Td or Tdap) 08/10/2022    Breast cancer screen  07/08/2023    Colorectal Cancer Screen  07/20/2024    Hepatitis C screen  Completed    HIV screen  Completed    Hepatitis A vaccine  Aged Out    Hepatitis B vaccine  Aged Out    Hib vaccine  Aged Out    Meningococcal (ACWY) vaccine  Aged Out    Pneumococcal 0-64 years Vaccine  Aged Out       Subjective:      Review of Systems   Constitutional: Positive for fatigue and fever. Negative for chills and unexpected weight change. HENT: Positive for sinus pressure. Negative for congestion, hearing loss, rhinorrhea and sore throat. Eyes: Negative for photophobia, discharge and visual disturbance. Respiratory: Positive for cough. Negative for chest tightness and shortness of breath. Cardiovascular: Negative for chest pain, palpitations and leg swelling. Gastrointestinal: Positive for nausea. Negative for abdominal distention, abdominal pain, constipation, diarrhea and vomiting. Endocrine: Negative for polydipsia and polyuria. Genitourinary: Negative for decreased urine volume, difficulty urinating, frequency and urgency. Musculoskeletal: Negative for arthralgias, gait problem and myalgias. Skin: Negative for rash. Allergic/Immunologic: Negative for food allergies. Neurological: Negative for dizziness, weakness, numbness and headaches. Hematological: Negative for adenopathy. Psychiatric/Behavioral: Negative for dysphoric mood and sleep disturbance. The patient is not nervous/anxious.         Objective:     /60   Pulse 66   Ht 5' (1.524 m)   Wt 200 lb 3.2 oz (90.8 kg)   SpO2 95%   BMI 39.10 kg/m²   Physical Exam  Constitutional:       General: She is not in acute distress. Appearance: Normal appearance. She is not ill-appearing. HENT:      Head: Normocephalic and atraumatic. Right Ear: Tympanic membrane, ear canal and external ear normal.      Left Ear: Tympanic membrane, ear canal and external ear normal.      Ears:      Comments: Tube in right ear. Nose: Nose normal.      Mouth/Throat:      Mouth: Mucous membranes are moist.   Eyes:      Extraocular Movements: Extraocular movements intact. Conjunctiva/sclera: Conjunctivae normal.      Pupils: Pupils are equal, round, and reactive to light. Neck:      Vascular: No carotid bruit. Cardiovascular:      Rate and Rhythm: Normal rate and regular rhythm. Pulses: Normal pulses. Heart sounds: Normal heart sounds. Pulmonary:      Effort: Pulmonary effort is normal. No respiratory distress. Breath sounds: Normal breath sounds. Abdominal:      General: Bowel sounds are normal. There is no distension. Tenderness: There is no abdominal tenderness. Musculoskeletal:         General: Normal range of motion. Cervical back: Normal range of motion and neck supple. Lymphadenopathy:      Cervical: No cervical adenopathy. Skin:     General: Skin is warm and dry. Neurological:      General: No focal deficit present. Mental Status: She is alert and oriented to person, place, and time. Psychiatric:         Mood and Affect: Mood normal.         Behavior: Behavior normal.         Thought Content: Thought content normal.         Assessment and Plan:          1. Cough  -     predniSONE (DELTASONE) 20 MG tablet;  Take 1 tablet by mouth 2 times daily for 5 days, Disp-10 tablet, R-0Normal  -     azithromycin (ZITHROMAX) 250 MG tablet; 2 tablets now then 1 daily until gone., Disp-1 packet, R-0Normal  -     albuterol sulfate HFA (VENTOLIN HFA) 108 (90 Base) MCG/ACT inhaler; Inhale 2 puffs into the lungs 4 times daily as needed for Wheezing, Disp-18 g, R-0Normal  -     benzonatate (TESSALON PERLES) 100 MG capsule; Take 1 capsule by mouth 3 times daily as needed for Cough, Disp-30 capsule, R-0Normal             Patient given educational materials - see patient instructions. Discussed use, benefit, and side effects of prescribed medications. All patient questions answered. Pt voiced understanding. Reviewed health maintenance. Instructed to continue current medications, diet and exercise. Patient agreed with treatment plan. Follow up as directed.      Electronically signed by FATOU Phan on 3/28/2022 at 11:28 AM

## 2022-04-19 DIAGNOSIS — R05.9 COUGH: ICD-10-CM

## 2022-04-19 RX ORDER — ALBUTEROL SULFATE 90 UG/1
2 AEROSOL, METERED RESPIRATORY (INHALATION) 4 TIMES DAILY PRN
Qty: 18 EACH | Refills: 2 | Status: SHIPPED | OUTPATIENT
Start: 2022-04-19

## 2022-06-03 ENCOUNTER — OFFICE VISIT (OUTPATIENT)
Dept: PRIMARY CARE CLINIC | Age: 62
End: 2022-06-03
Payer: COMMERCIAL

## 2022-06-03 VITALS
HEART RATE: 62 BPM | OXYGEN SATURATION: 99 % | WEIGHT: 202.4 LBS | BODY MASS INDEX: 39.53 KG/M2 | DIASTOLIC BLOOD PRESSURE: 78 MMHG | SYSTOLIC BLOOD PRESSURE: 120 MMHG

## 2022-06-03 DIAGNOSIS — H65.05 RECURRENT ACUTE SEROUS OTITIS MEDIA OF LEFT EAR: Primary | ICD-10-CM

## 2022-06-03 PROCEDURE — 99213 OFFICE O/P EST LOW 20 MIN: CPT | Performed by: FAMILY MEDICINE

## 2022-06-03 RX ORDER — GUAIFENESIN 600 MG/1
600 TABLET, EXTENDED RELEASE ORAL 2 TIMES DAILY
Qty: 10 TABLET | Refills: 0 | Status: SHIPPED | OUTPATIENT
Start: 2022-06-03 | End: 2022-06-08

## 2022-06-03 RX ORDER — PREDNISONE 20 MG/1
20 TABLET ORAL 2 TIMES DAILY
Qty: 10 TABLET | Refills: 0 | Status: SHIPPED | OUTPATIENT
Start: 2022-06-03 | End: 2022-06-08

## 2022-06-03 RX ORDER — TRIAMCINOLONE ACETONIDE 55 UG/1
2 SPRAY, METERED NASAL DAILY
Qty: 1 EACH | Refills: 0 | Status: SHIPPED | OUTPATIENT
Start: 2022-06-03

## 2022-06-03 ASSESSMENT — ENCOUNTER SYMPTOMS
SHORTNESS OF BREATH: 0
RHINORRHEA: 1
COUGH: 0

## 2022-06-03 NOTE — PROGRESS NOTES
[facility] PRIMARY CARE  [id number] [place] [id number]  Dept: 40 Candy Esqueda is a 59 y.o. female Established patient, who presents today for her medical conditions/complaints as noted below. Chief Complaint   Patient presents with    Otalgia     Left ear pain x 1 week       HPI:     HPI- pain started Monday. Just left ear. Feels plugged and then gets a little sore, but not severe like an ear ache. Can hear crackling as well. Takes daily allergy med- flonase and took extra zyrtec the other day. Has tube in the other ear- has been there for years, but the one in the left ear fell out a long time ago.       Reviewed prior notes: None   Reviewed previous:  na    LDL Cholesterol (mg/dL)   Date Value   12/10/2012 74     LDL Calculated (mg/dL)   Date Value   2018 37   2016 88       (goal LDL is <100)   AST (U/L)   Date Value   2021 24     ALT (U/L)   Date Value   2021 28     BUN (mg/dL)   Date Value   2021 15     TSH (mIU/L)   Date Value   2020 2.38     BP Readings from Last 3 Encounters:   22 120/78   22 116/60   21 110/62          (goal 120/80)    Past Medical History:   Diagnosis Date    Alcohol dependence ([facility] [address]     CAD S/P percutaneous coronary angioplasty 12    LAD , ANGELINE    Depression     Dyslipidemia     HTN (hypertension)     Left ventricular diastolic dysfunction 3/2/5459    Obesity     Seborrheic keratoses, inflamed 2016    Skin sensation disturbance 2016      Past Surgical History:   Procedure Laterality Date     SECTION      CORONARY ANGIOPLASTY WITH STENT PLACEMENT      MYRINGOTOMY AND TYMPANOSTOMY TUBE PLACEMENT         Family History   Problem Relation Age of Onset    Other Mother         hypertension    Macular Degen Other         macular degeration    Other Father         hx not known       Social History     Tobacco Use    Smoking status: Former Smoker     Packs/day: 1.00     Years: 40.00     Pack years: 40.00     Types: Cigarettes     Quit date: 2012     Years since quittin.4    Smokeless tobacco: Never Used   Substance Use Topics    Alcohol use: Yes     Alcohol/week: 14.0 standard drinks     Types: 14 Cans of beer per week      Current Outpatient Medications   Medication Sig Dispense Refill    triamcinolone (NASACORT ALLERGY 24HR) 55 MCG/ACT nasal inhaler 2 sprays by Each Nostril route daily 1 each 0    predniSONE (DELTASONE) 20 MG tablet Take 1 tablet by mouth 2 times daily for 5 days 10 tablet 0    guaiFENesin (MUCINEX) 600 MG extended release tablet Take 1 tablet by mouth 2 times daily for 5 days 10 tablet 0    albuterol sulfate  (90 Base) MCG/ACT inhaler INHALE 2 PUFFS INTO THE LUNGS 4 TIMES DAILY AS NEEDED FOR WHEEZING. 18 each 2    atenolol (TENORMIN) 25 MG tablet TAKE 1 TABLET BY MOUTH EVERY DAY 90 tablet 3    buPROPion (WELLBUTRIN XL) 150 MG extended release tablet TAKE 1 TABLET BY MOUTH EVERY DAY IN THE MORNING 90 tablet 3    FLUoxetine (PROZAC) 40 MG capsule TAKE 1 CAPSULE BY MOUTH EVERY DAY 90 capsule 3    dicyclomine (BENTYL) 10 MG capsule Take 1 capsule by mouth every 6 hours as needed (cramping) 90 capsule 0    triamcinolone (KENALOG) 0.147 MG/GM topical spray Apply topically 2 times daily. 100 g 3    desoximetasone (TOPICORT) 0.05 % cream Apply 1 Squirt topically 2 times daily Apply topically 2 times daily.  fluticasone (FLONASE) 50 MCG/ACT nasal spray 1 spray by Nasal route daily      aspirin 325 MG tablet Take 81 mg by mouth daily       clopidogrel (PLAVIX) 75 MG tablet Take 75 mg by mouth daily.  atorvastatin (LIPITOR) 40 MG tablet Take 40 mg by mouth daily.  lisinopril-hydrochlorothiazide (PRINZIDE;ZESTORETIC) 10-12.5 MG per tablet Take 1 tablet by mouth daily.  nitroGLYCERIN (NITROSTAT) 0.4 MG SL tablet Place 0.4 mg under the tongue every 5 minutes as needed.        No current facility-administered medications for this visit. Allergies   Allergen Reactions    Amoxicillin     Trimethoprim     Pcn [Penicillins] Rash    Sulfa Antibiotics Rash       Health Maintenance   Topic Date Due    Cervical cancer screen  Never done    Shingles vaccine (1 of 2) Never done    Lipids  08/21/2019    COVID-19 Vaccine (3 - Booster for Moderna series) 09/10/2021    Low dose CT lung screening  07/08/2022    DTaP/Tdap/Td vaccine (2 - Td or Tdap) 08/10/2022    Flu vaccine (Season Ended) 09/01/2022    Depression Monitoring  03/28/2023    Breast cancer screen  07/08/2023    Colorectal Cancer Screen  07/20/2024    Hepatitis C screen  Completed    HIV screen  Completed    Hepatitis A vaccine  Aged Out    Hepatitis B vaccine  Aged Out    Hib vaccine  Aged Out    Meningococcal (ACWY) vaccine  Aged Out    Pneumococcal 0-64 years Vaccine  Aged Out       Subjective:      Review of Systems   Constitutional: Negative for chills and fever. HENT: Positive for congestion, hearing loss (even before ear started bothering her.), postnasal drip and rhinorrhea (took mucinex and has not helped ear. ). Negative for tinnitus. Respiratory: Negative for cough and shortness of breath. Neurological: Negative for dizziness and light-headedness. Objective:     /78   Pulse 62   Wt 202 lb 6.4 oz (91.8 kg)   SpO2 99%   BMI 39.53 kg/m²   Physical Exam  Vitals and nursing note reviewed. Constitutional:       General: She is not in acute distress. Appearance: She is well-developed. She is not ill-appearing. HENT:      Head: Normocephalic and atraumatic. Right Ear: External ear normal.      Left Ear: External ear normal.   Eyes:      General: No scleral icterus. Right eye: No discharge. Left eye: No discharge. Conjunctiva/sclera: Conjunctivae normal.   Neck:      Thyroid: No thyromegaly. Trachea: No tracheal deviation.    Cardiovascular:      Rate and Rhythm: Normal rate and regular rhythm. Heart sounds: Normal heart sounds. Pulmonary:      Effort: Pulmonary effort is normal. No respiratory distress. Breath sounds: Normal breath sounds. No wheezing. Lymphadenopathy:      Cervical: No cervical adenopathy. Skin:     General: Skin is warm. Findings: No rash. Neurological:      Mental Status: She is alert and oriented to person, place, and time. Psychiatric:         Mood and Affect: Mood normal.         Behavior: Behavior normal.         Thought Content: Thought content normal.         Assessment/Plan:   1. Recurrent acute serous otitis media of left ear       Return if symptoms worsen or fail to improve. Data Unavailable     No orders of the defined types were placed in this encounter. Orders Placed This Encounter   Medications    triamcinolone (NASACORT ALLERGY 24HR) 55 MCG/ACT nasal inhaler     Si sprays by Each Nostril route daily     Dispense:  1 each     Refill:  0    predniSONE (DELTASONE) 20 MG tablet     Sig: Take 1 tablet by mouth 2 times daily for 5 days     Dispense:  10 tablet     Refill:  0    guaiFENesin (MUCINEX) 600 MG extended release tablet     Sig: Take 1 tablet by mouth 2 times daily for 5 days     Dispense:  10 tablet     Refill:  0       Patient given educational materials - see patient instructions. Discussed use, benefit, and side effects of prescribed medications. All patient questions answered. Pt voiced understanding. Reviewed health maintenance. Instructed to continue current medications, diet and exercise. Patient agreed with treatment plan. Follow up as directed.      Electronically signed by Erna Cisneros MD on 6/3/2022 at 11:23 AM

## 2022-06-08 ENCOUNTER — TELEPHONE (OUTPATIENT)
Dept: ONCOLOGY | Age: 62
End: 2022-06-08

## 2022-06-08 DIAGNOSIS — Z87.891 PERSONAL HISTORY OF NICOTINE DEPENDENCE: Primary | ICD-10-CM

## 2022-06-08 NOTE — TELEPHONE ENCOUNTER
Our records indicate that your patient is coming due for their annual lung cancer screening follow up testing. For your convenience, we have pended the order for the scan for you. If you do not agree with the need for the test, please cancel the order and let us know. Sincerely,    [id number] [facility] Screening Program    Auto printed reminder letter sent to patient.

## 2023-01-13 RX ORDER — FLUOXETINE HYDROCHLORIDE 40 MG/1
CAPSULE ORAL
Qty: 90 CAPSULE | Refills: 1 | Status: SHIPPED | OUTPATIENT
Start: 2023-01-13

## 2023-01-13 RX ORDER — ATENOLOL 25 MG/1
TABLET ORAL
Qty: 90 TABLET | Refills: 1 | Status: SHIPPED | OUTPATIENT
Start: 2023-01-13

## 2023-01-13 RX ORDER — BUPROPION HYDROCHLORIDE 150 MG/1
TABLET ORAL
Qty: 90 TABLET | Refills: 1 | Status: SHIPPED | OUTPATIENT
Start: 2023-01-13

## 2023-01-25 ENCOUNTER — OFFICE VISIT (OUTPATIENT)
Dept: PRIMARY CARE CLINIC | Age: 63
End: 2023-01-25
Payer: COMMERCIAL

## 2023-01-25 VITALS
HEART RATE: 66 BPM | BODY MASS INDEX: 37.42 KG/M2 | WEIGHT: 190.6 LBS | DIASTOLIC BLOOD PRESSURE: 68 MMHG | OXYGEN SATURATION: 98 % | HEIGHT: 60 IN | SYSTOLIC BLOOD PRESSURE: 124 MMHG

## 2023-01-25 DIAGNOSIS — J01.91 ACUTE RECURRENT SINUSITIS, UNSPECIFIED LOCATION: ICD-10-CM

## 2023-01-25 DIAGNOSIS — J06.9 UPPER RESPIRATORY TRACT INFECTION, UNSPECIFIED TYPE: Primary | ICD-10-CM

## 2023-01-25 PROCEDURE — 3074F SYST BP LT 130 MM HG: CPT | Performed by: PHYSICIAN ASSISTANT

## 2023-01-25 PROCEDURE — 99213 OFFICE O/P EST LOW 20 MIN: CPT | Performed by: PHYSICIAN ASSISTANT

## 2023-01-25 PROCEDURE — 3078F DIAST BP <80 MM HG: CPT | Performed by: PHYSICIAN ASSISTANT

## 2023-01-25 RX ORDER — DOXYCYCLINE HYCLATE 100 MG
100 TABLET ORAL 2 TIMES DAILY
Qty: 20 TABLET | Refills: 0 | Status: CANCELLED | OUTPATIENT
Start: 2023-01-25 | End: 2023-02-04

## 2023-01-25 RX ORDER — AZITHROMYCIN 250 MG/1
250 TABLET, FILM COATED ORAL SEE ADMIN INSTRUCTIONS
Qty: 6 TABLET | Refills: 0 | Status: CANCELLED | OUTPATIENT
Start: 2023-01-25 | End: 2023-01-30

## 2023-01-25 RX ORDER — BENZONATATE 100 MG/1
100 CAPSULE ORAL 3 TIMES DAILY PRN
Qty: 30 CAPSULE | Refills: 0 | Status: SHIPPED | OUTPATIENT
Start: 2023-01-25 | End: 2023-02-01

## 2023-01-25 RX ORDER — LEVOFLOXACIN 500 MG/1
500 TABLET, FILM COATED ORAL DAILY
Qty: 10 TABLET | Refills: 0 | Status: SHIPPED | OUTPATIENT
Start: 2023-01-25 | End: 2023-02-04

## 2023-01-25 ASSESSMENT — ENCOUNTER SYMPTOMS
WHEEZING: 1
TROUBLE SWALLOWING: 0
EYE DISCHARGE: 0
COUGH: 1
NAUSEA: 0
DIARRHEA: 0
ABDOMINAL PAIN: 0
SHORTNESS OF BREATH: 1
SORE THROAT: 0
VOMITING: 0
VOICE CHANGE: 1
EYE REDNESS: 0
SINUS PRESSURE: 1

## 2023-01-25 ASSESSMENT — PATIENT HEALTH QUESTIONNAIRE - PHQ9
8. MOVING OR SPEAKING SO SLOWLY THAT OTHER PEOPLE COULD HAVE NOTICED. OR THE OPPOSITE, BEING SO FIGETY OR RESTLESS THAT YOU HAVE BEEN MOVING AROUND A LOT MORE THAN USUAL: 0
SUM OF ALL RESPONSES TO PHQ QUESTIONS 1-9: 0
SUM OF ALL RESPONSES TO PHQ QUESTIONS 1-9: 0
7. TROUBLE CONCENTRATING ON THINGS, SUCH AS READING THE NEWSPAPER OR WATCHING TELEVISION: 0
SUM OF ALL RESPONSES TO PHQ QUESTIONS 1-9: 0
4. FEELING TIRED OR HAVING LITTLE ENERGY: 0
SUM OF ALL RESPONSES TO PHQ9 QUESTIONS 1 & 2: 0
10. IF YOU CHECKED OFF ANY PROBLEMS, HOW DIFFICULT HAVE THESE PROBLEMS MADE IT FOR YOU TO DO YOUR WORK, TAKE CARE OF THINGS AT HOME, OR GET ALONG WITH OTHER PEOPLE: 0
9. THOUGHTS THAT YOU WOULD BE BETTER OFF DEAD, OR OF HURTING YOURSELF: 0
3. TROUBLE FALLING OR STAYING ASLEEP: 0
SUM OF ALL RESPONSES TO PHQ QUESTIONS 1-9: 0
5. POOR APPETITE OR OVEREATING: 0
1. LITTLE INTEREST OR PLEASURE IN DOING THINGS: 0
6. FEELING BAD ABOUT YOURSELF - OR THAT YOU ARE A FAILURE OR HAVE LET YOURSELF OR YOUR FAMILY DOWN: 0
2. FEELING DOWN, DEPRESSED OR HOPELESS: 0

## 2023-01-25 NOTE — PROGRESS NOTES
[facility] PRIMARY CARE  [address]  [facility] [address]  Dept: 40 Candy Esqueda is a 57 y.o. female Established patient, who presents today for her medical conditions/complaints as noted below. Chief Complaint   Patient presents with    URI     Started 23       HPI:     HPI Patient notes that she has been sick since the  which started with losing some hearing in her left ear due to \"fluid. \" She also says that her head feels as though its full of water and her sinuses had some pressure. She also has a cough and started some cold and cough medicine and some mucinex. She also has lost her voice a bit. She is still taking some mucinex and delcum for cough. She says that the meds aren't helping anymore. When she gets really bad she uses her albuterol inhaler.      Reviewed prior notes None  Reviewed previous  NA    LDL Cholesterol (mg/dL)   Date Value   12/10/2012 74     LDL Calculated (mg/dL)   Date Value   2018 37   2016 88       (goal LDL is <100)   AST (U/L)   Date Value   2021 24     ALT (U/L)   Date Value   2021 28     BUN (mg/dL)   Date Value   2021 15     TSH (mIU/L)   Date Value   2020 2.38     BP Readings from Last 3 Encounters:   23 124/68   22 120/78   22 116/60          (goal 120/80)  No results found for: LABA1C  Past Medical History:   Diagnosis Date    Alcohol dependence ([facility] [address]     CAD S/P percutaneous coronary angioplasty 12    LAD , ANGELINE    Depression     Dyslipidemia     HTN (hypertension)     Left ventricular diastolic dysfunction 2958    Obesity     Seborrheic keratoses, inflamed 2016    Skin sensation disturbance 2016      Past Surgical History:   Procedure Laterality Date     SECTION      CORONARY ANGIOPLASTY WITH STENT PLACEMENT      MYRINGOTOMY AND TYMPANOSTOMY TUBE PLACEMENT         Family History   Problem Relation Age of Onset    Other Mother hypertension    Macular Degen Other         macular degeration    Other Father         hx not known       Social History     Tobacco Use    Smoking status: Former     Packs/day: 1.00     Years: 40.00     Pack years: 40.00     Types: Cigarettes     Quit date: 12/9/2012     Years since quitting: 10.1    Smokeless tobacco: Never   Substance Use Topics    Alcohol use: Yes     Alcohol/week: 14.0 standard drinks     Types: 14 Cans of beer per week      Current Outpatient Medications   Medication Sig Dispense Refill    levoFLOXacin (LEVAQUIN) 500 MG tablet Take 1 tablet by mouth daily for 10 days 10 tablet 0    FLUoxetine (PROZAC) 40 MG capsule TAKE 1 CAPSULE BY MOUTH EVERY DAY 90 capsule 1    buPROPion (WELLBUTRIN XL) 150 MG extended release tablet TAKE 1 TABLET BY MOUTH EVERY DAY IN THE MORNING 90 tablet 1    atenolol (TENORMIN) 25 MG tablet TAKE 1 TABLET BY MOUTH EVERY DAY 90 tablet 1    triamcinolone (NASACORT ALLERGY 24HR) 55 MCG/ACT nasal inhaler 2 sprays by Each Nostril route daily 1 each 0    albuterol sulfate  (90 Base) MCG/ACT inhaler INHALE 2 PUFFS INTO THE LUNGS 4 TIMES DAILY AS NEEDED FOR WHEEZING. 18 each 2    dicyclomine (BENTYL) 10 MG capsule Take 1 capsule by mouth every 6 hours as needed (cramping) 90 capsule 0    triamcinolone (KENALOG) 0.147 MG/GM topical spray Apply topically 2 times daily. 100 g 3    desoximetasone (TOPICORT) 0.05 % cream Apply 1 Squirt topically 2 times daily Apply topically 2 times daily. fluticasone (FLONASE) 50 MCG/ACT nasal spray 1 spray by Nasal route daily      aspirin 325 MG tablet Take 81 mg by mouth daily       clopidogrel (PLAVIX) 75 MG tablet Take 75 mg by mouth daily. atorvastatin (LIPITOR) 40 MG tablet Take 40 mg by mouth daily. lisinopril-hydrochlorothiazide (PRINZIDE;ZESTORETIC) 10-12.5 MG per tablet Take 1 tablet by mouth daily. nitroGLYCERIN (NITROSTAT) 0.4 MG SL tablet Place 0.4 mg under the tongue every 5 minutes as needed. No current facility-administered medications for this visit. Allergies   Allergen Reactions    Amoxicillin     Trimethoprim     Pcn [Penicillins] Rash    Sulfa Antibiotics Rash       Health Maintenance   Topic Date Due    Cervical cancer screen  Never done    Shingles vaccine (1 of 2) Never done    Lipids  08/21/2019    COVID-19 Vaccine (3 - Booster for Moderna series) 06/05/2021    Low dose CT lung screening  07/08/2022    Flu vaccine (1) 08/01/2022    DTaP/Tdap/Td vaccine (2 - Td or Tdap) 08/10/2022    Depression Monitoring  03/28/2023    Breast cancer screen  07/08/2023    Colorectal Cancer Screen  07/20/2024    Hepatitis C screen  Completed    HIV screen  Completed    Hepatitis A vaccine  Aged Out    Hib vaccine  Aged Out    Meningococcal (ACWY) vaccine  Aged Out    Pneumococcal 0-64 years Vaccine  Aged Out       Subjective:      Review of Systems   Constitutional:  Negative for chills, diaphoresis and fever. HENT:  Positive for congestion, hearing loss, sinus pressure and voice change. Negative for sore throat and trouble swallowing. Eyes:  Negative for discharge and redness. Respiratory:  Positive for cough, shortness of breath and wheezing. Cardiovascular:  Negative for chest pain and palpitations. Gastrointestinal:  Negative for abdominal pain, diarrhea, nausea and vomiting. Genitourinary:  Negative for dysuria and frequency. Musculoskeletal:  Negative for arthralgias and myalgias. Neurological:  Positive for dizziness and headaches (On an off). Negative for light-headedness. Psychiatric/Behavioral:  Positive for sleep disturbance (Coughing). Objective:     /68   Pulse 66   Ht 5' (1.524 m)   Wt 190 lb 9.6 oz (86.5 kg)   SpO2 98%   BMI 37.22 kg/m²   Physical Exam  Constitutional:       Appearance: Normal appearance. HENT:      Head: Normocephalic. Right Ear: External ear normal.      Left Ear: External ear normal. Tympanic membrane is scarred.       Ears: Comments: Right ear: Tubes with cerumen covering the TM. Left ear: scar tissue over the TM with some cerumen     Nose: Congestion present. Mouth/Throat:      Pharynx: Posterior oropharyngeal erythema present. Cardiovascular:      Rate and Rhythm: Normal rate and regular rhythm. Heart sounds: Normal heart sounds. Pulmonary:      Effort: Pulmonary effort is normal.      Breath sounds: Normal breath sounds. Lymphadenopathy:      Cervical: No cervical adenopathy. Neurological:      Mental Status: She is alert and oriented to person, place, and time. Psychiatric:         Mood and Affect: Mood normal.         Behavior: Behavior normal.       Assessment and Plan:          1. Upper respiratory tract infection, unspecified type  -     levoFLOXacin (LEVAQUIN) 500 MG tablet; Take 1 tablet by mouth daily for 10 days, Disp-10 tablet, R-0Normal  2. Acute recurrent sinusitis, unspecified location  -     levoFLOXacin (LEVAQUIN) 500 MG tablet; Take 1 tablet by mouth daily for 10 days, Disp-10 tablet, R-0Normal           Return for Follow up if symptoms persist or worsen. Patient given educational materials - see patient instructions. Discussed use, benefit, and side effects of prescribed medications. All patient questions answered. Pt voiced understanding. Reviewed health maintenance. Instructed to continue current medications, diet and exercise. Patient agreed with treatment plan. Follow up as directed.      Electronically signed by FATOU Giordano on 1/25/2023 at 12:00 PM

## 2023-02-24 DIAGNOSIS — R05.9 COUGH: ICD-10-CM

## 2023-02-24 RX ORDER — ALBUTEROL SULFATE 90 UG/1
2 AEROSOL, METERED RESPIRATORY (INHALATION) 4 TIMES DAILY PRN
Qty: 18 EACH | Refills: 2 | Status: SHIPPED | OUTPATIENT
Start: 2023-02-24

## 2023-03-14 NOTE — TELEPHONE ENCOUNTER
Last filled 09/19/2019. Neuro: Strength 5/5 in Bilateral Upper and Lower Extremities. Sensation intact in bilateral upper and lower extremities. PERRLA. EOMs full and intact. Sensation intact in the face. Patient able to smile, puff out cheeks, close eyes tightly and prevent eye-opening by examiner. Patient able to shoulder shrug against resistance. No deviation of the tongue. Palate and uvula rise - midline uvula. Alert and interactive, no focal deficits

## 2023-07-17 RX ORDER — ATENOLOL 25 MG/1
TABLET ORAL
Qty: 90 TABLET | Refills: 1 | Status: SHIPPED | OUTPATIENT
Start: 2023-07-17 | End: 2023-07-19 | Stop reason: SDUPTHER

## 2023-07-17 RX ORDER — FLUOXETINE HYDROCHLORIDE 40 MG/1
CAPSULE ORAL
Qty: 90 CAPSULE | Refills: 1 | Status: SHIPPED | OUTPATIENT
Start: 2023-07-17 | End: 2023-07-19 | Stop reason: SDUPTHER

## 2023-07-17 RX ORDER — BUPROPION HYDROCHLORIDE 150 MG/1
TABLET ORAL
Qty: 90 TABLET | Refills: 1 | Status: SHIPPED | OUTPATIENT
Start: 2023-07-17

## 2023-07-18 RX ORDER — OMEPRAZOLE 20 MG/1
20 CAPSULE, DELAYED RELEASE ORAL DAILY
COMMUNITY
End: 2023-07-19 | Stop reason: SDUPTHER

## 2023-07-19 RX ORDER — OMEPRAZOLE 20 MG/1
20 CAPSULE, DELAYED RELEASE ORAL DAILY
Qty: 90 CAPSULE | Refills: 3 | Status: SHIPPED | OUTPATIENT
Start: 2023-07-19

## 2023-07-19 RX ORDER — ATENOLOL 25 MG/1
25 TABLET ORAL DAILY
Qty: 90 TABLET | Refills: 3 | Status: SHIPPED | OUTPATIENT
Start: 2023-07-19

## 2023-07-19 RX ORDER — FLUTICASONE PROPIONATE 50 MCG
1 SPRAY, SUSPENSION (ML) NASAL DAILY
Qty: 16 G | Refills: 3 | Status: SHIPPED | OUTPATIENT
Start: 2023-07-19

## 2023-07-19 RX ORDER — FLUOXETINE HYDROCHLORIDE 40 MG/1
40 CAPSULE ORAL DAILY
Qty: 90 CAPSULE | Refills: 3 | Status: SHIPPED | OUTPATIENT
Start: 2023-07-19

## 2023-07-27 RX ORDER — BUPROPION HYDROCHLORIDE 150 MG/1
150 TABLET ORAL EVERY MORNING
Qty: 90 TABLET | Refills: 1 | Status: SHIPPED | OUTPATIENT
Start: 2023-07-27

## 2023-10-05 RX ORDER — BUPROPION HYDROCHLORIDE 150 MG/1
150 TABLET ORAL EVERY MORNING
Qty: 90 TABLET | Refills: 1 | Status: SHIPPED | OUTPATIENT
Start: 2023-10-05

## 2023-10-16 ENCOUNTER — TELEPHONE (OUTPATIENT)
Dept: PRIMARY CARE CLINIC | Age: 63
End: 2023-10-16

## 2023-10-16 NOTE — TELEPHONE ENCOUNTER
Left voicemail for patient to call back to reschedule AWV due to Dr GREEN leaving early that day. Pt due any time for wellness visit.

## 2023-11-10 ENCOUNTER — TELEPHONE (OUTPATIENT)
Dept: PRIMARY CARE CLINIC | Age: 63
End: 2023-11-10

## 2023-11-10 NOTE — TELEPHONE ENCOUNTER
Hospitalist Progress Note NAME: Urban Gant :  1928 MRN:  [id number] Assessment / Plan: 
Acute hypoxic respiratory failure POA 
NSTEMI 
CHF exacerbation,Diastolic, 
? Community-acquired pneumonia Chest x-raySubtle interstitial process. No confluent infiltrate. Incidental 
hiatal hernia. -CT chest mozaic GGO can be seen due to Pulm edema/infectios process 
-trop Mild elevated, ProBNP high 
-D-dimer elevated likely from current illness 
 
-Presentation likely 2/2 CHF, volume status seems to be improved. -Continue gentle Diuresis, Strict I & Os, daily weight 
-ECHO reviewed 
-stress test tomorrow 
-aspirin daily 
-due to elevated d dimer, elevated PHTN, will do CTA chest to rule out PE 
 
-Rapid covid-19 negative, respi panel PCR negative 
-Continue azithro/rocephin Echo in 2019 shows EF 55 to 53%, normal diastolic function. 
  
Mild elevated creat BUN/creatinine21/1.24, Creat stable 
  
History of multiple falls at home PT and OT consulted, case management consulted. 
-Lithium level slightly elevated at 1.4, should be less than 1.2 Will hold lithium for now, recheck level tomorrow. History of hypothyroidism Bipolar disorder Dementia with neurocognitive deficit Depression Insomnia Continue the home medications. discussed with dtr 
  
Code Status: DNR Surrogate Decision Maker: Daughter 
  
DVT Prophylaxis: Lovenox GI Prophylaxis: not indicated 
  
Baseline: Ambulatory with assistance at home Subjective: Chief Complaint / Reason for Physician Visit On RA, doing well, no SOB Review of Systems: 
Symptom Y/N Comments  Symptom Y/N Comments Fever/Chills    Chest Pain Poor Appetite    Edema Cough    Abdominal Pain Sputum    Joint Pain SOB/SHERMAN    Pruritis/Rash Nausea/vomit    Tolerating PT/OT Diarrhea    Tolerating Diet Constipation    Other Could NOT obtain due to: dementia Objective: VITALS:  
Last 24hrs Pts 11/13 appt was scheduled incorrectly at 15 minutes for a medicare wellness visit. Spoke with pt and she will cb to reschedule. VS reviewed since prior progress note. Most recent are: 
Patient Vitals for the past 24 hrs: 
 Temp Pulse Resp BP SpO2  
04/04/21 0815 98.6 °F (37 °C) (!) 55 16 (!) 136/56 96 % 04/04/21 0330 97.8 °F (36.6 °C) (!) 59 19 (!) 153/59 95 % 04/03/21 2251 97.6 °F (36.4 °C) (!) 59 17 (!) 147/75 98 % 04/03/21 1935 97.9 °F (36.6 °C) 62 19 135/67 97 % 04/03/21 1449    139/60   
04/03/21 1440 98.2 °F (36.8 °C) (!) 56 18 139/60 99 % 04/03/21 1300  68   96 % 04/03/21 1040 98.3 °F (36.8 °C) 61 20 (!) 154/69 98 % No intake or output data in the 24 hours ending 04/04/21 8776 I had a face to face encounter and independently examined this patient on 4/4/2021, as outlined below: PHYSICAL EXAM: 
General: WD, WN. Alert, cooperative, no acute distress EENT:  EOMI. Anicteric sclerae. MMM Resp:  Scattered rales b/l CV:  Regular  rhythm,  No edema GI:  Soft, Non distended, Non tender. +Bowel sounds Neurologic:  Alert and oriented to person and place. Psych:   Fair insight Skin:  No rashes. No jaundice Reviewed most current lab test results and cultures  YES Reviewed most current radiology test results   YES Review and summation of old records today    NO Reviewed patient's current orders and MAR    YES 
PMH/SH reviewed - no change compared to H&P 
________________________________________________________________________ Care Plan discussed with: 
  Comments Patient x Family RN x Care Manager Consultant Multidiciplinary team rounds were held today with , nursing, pharmacist and clinical coordinator. Patient's plan of care was discussed; medications were reviewed and discharge planning was addressed. ________________________________________________________________________ Total NON critical care TIME: 35  Minutes Total CRITICAL CARE TIME Spent:   Minutes non procedure based Comments >50% of visit spent in counseling and coordination of care    
________________________________________________________________________ Leyla Foster MD  
 
Procedures: see electronic medical records for all procedures/Xrays and details which were not copied into this note but were reviewed prior to creation of Plan. LABS: 
I reviewed today's most current labs and imaging studies. Pertinent labs include: 
Recent Labs 04/04/21 0227 04/03/21 
0340 04/02/21 
1348 WBC 6.5 6.4 8.1 HGB 11.0* 10.6* 12.1 HCT 35.9 34.4* 38.5  151 171 Recent Labs 04/03/21 
0340 04/02/21 
1348  138  
K 3.9 4.3 * 109* CO2 29 25 GLU 92 99 BUN 25* 21* CREA 1.15* 1.24* CA 8.0* 8.6 MG 2.4  --   
ALB  --  3.4* TBILI  --  0.7 ALT  --  22 Signed: Leyla Foster MD

## 2023-12-29 RX ORDER — ATENOLOL 25 MG/1
25 TABLET ORAL DAILY
Qty: 100 TABLET | Refills: 2 | Status: SHIPPED | OUTPATIENT
Start: 2023-12-29

## 2024-01-03 RX ORDER — FLUTICASONE PROPIONATE 50 MCG
SPRAY, SUSPENSION (ML) NASAL
Qty: 32 G | Refills: 1 | Status: SHIPPED | OUTPATIENT
Start: 2024-01-03

## 2024-01-05 ENCOUNTER — OFFICE VISIT (OUTPATIENT)
Dept: PRIMARY CARE CLINIC | Age: 64
End: 2024-01-05

## 2024-01-05 VITALS
BODY MASS INDEX: 37.5 KG/M2 | SYSTOLIC BLOOD PRESSURE: 124 MMHG | WEIGHT: 191 LBS | OXYGEN SATURATION: 98 % | DIASTOLIC BLOOD PRESSURE: 78 MMHG | HEIGHT: 60 IN | HEART RATE: 66 BPM

## 2024-01-05 DIAGNOSIS — Z23 NEED FOR VACCINATION: ICD-10-CM

## 2024-01-05 DIAGNOSIS — Z13.6 SCREENING FOR CARDIOVASCULAR CONDITION: ICD-10-CM

## 2024-01-05 DIAGNOSIS — I10 PRIMARY HYPERTENSION: ICD-10-CM

## 2024-01-05 DIAGNOSIS — R06.83 SNORING: ICD-10-CM

## 2024-01-05 DIAGNOSIS — Z12.31 ENCOUNTER FOR SCREENING MAMMOGRAM FOR BREAST CANCER: ICD-10-CM

## 2024-01-05 DIAGNOSIS — M79.671 FOOT PAIN, BILATERAL: ICD-10-CM

## 2024-01-05 DIAGNOSIS — M79.672 FOOT PAIN, BILATERAL: ICD-10-CM

## 2024-01-05 DIAGNOSIS — G47.33 OBSTRUCTIVE SLEEP APNEA SYNDROME: ICD-10-CM

## 2024-01-05 DIAGNOSIS — Z78.0 MENOPAUSE: ICD-10-CM

## 2024-01-05 DIAGNOSIS — Z13.1 SCREENING FOR DIABETES MELLITUS: ICD-10-CM

## 2024-01-05 DIAGNOSIS — Z87.891 PERSONAL HISTORY OF TOBACCO USE: ICD-10-CM

## 2024-01-05 DIAGNOSIS — Z00.00 WELCOME TO MEDICARE PREVENTIVE VISIT: Primary | ICD-10-CM

## 2024-01-05 SDOH — ECONOMIC STABILITY: HOUSING INSECURITY
IN THE LAST 12 MONTHS, WAS THERE A TIME WHEN YOU DID NOT HAVE A STEADY PLACE TO SLEEP OR SLEPT IN A SHELTER (INCLUDING NOW)?: NO

## 2024-01-05 SDOH — ECONOMIC STABILITY: FOOD INSECURITY: WITHIN THE PAST 12 MONTHS, THE FOOD YOU BOUGHT JUST DIDN'T LAST AND YOU DIDN'T HAVE MONEY TO GET MORE.: NEVER TRUE

## 2024-01-05 SDOH — ECONOMIC STABILITY: FOOD INSECURITY: WITHIN THE PAST 12 MONTHS, YOU WORRIED THAT YOUR FOOD WOULD RUN OUT BEFORE YOU GOT MONEY TO BUY MORE.: NEVER TRUE

## 2024-01-05 SDOH — ECONOMIC STABILITY: INCOME INSECURITY: HOW HARD IS IT FOR YOU TO PAY FOR THE VERY BASICS LIKE FOOD, HOUSING, MEDICAL CARE, AND HEATING?: NOT HARD AT ALL

## 2024-01-05 ASSESSMENT — PATIENT HEALTH QUESTIONNAIRE - PHQ9
SUM OF ALL RESPONSES TO PHQ QUESTIONS 1-9: 0
5. POOR APPETITE OR OVEREATING: 0
SUM OF ALL RESPONSES TO PHQ9 QUESTIONS 1 & 2: 0
3. TROUBLE FALLING OR STAYING ASLEEP: 0
10. IF YOU CHECKED OFF ANY PROBLEMS, HOW DIFFICULT HAVE THESE PROBLEMS MADE IT FOR YOU TO DO YOUR WORK, TAKE CARE OF THINGS AT HOME, OR GET ALONG WITH OTHER PEOPLE: 0
8. MOVING OR SPEAKING SO SLOWLY THAT OTHER PEOPLE COULD HAVE NOTICED. OR THE OPPOSITE, BEING SO FIGETY OR RESTLESS THAT YOU HAVE BEEN MOVING AROUND A LOT MORE THAN USUAL: 0
9. THOUGHTS THAT YOU WOULD BE BETTER OFF DEAD, OR OF HURTING YOURSELF: 0
SUM OF ALL RESPONSES TO PHQ QUESTIONS 1-9: 0
7. TROUBLE CONCENTRATING ON THINGS, SUCH AS READING THE NEWSPAPER OR WATCHING TELEVISION: 0
2. FEELING DOWN, DEPRESSED OR HOPELESS: 0
4. FEELING TIRED OR HAVING LITTLE ENERGY: 0
6. FEELING BAD ABOUT YOURSELF - OR THAT YOU ARE A FAILURE OR HAVE LET YOURSELF OR YOUR FAMILY DOWN: 0
1. LITTLE INTEREST OR PLEASURE IN DOING THINGS: 0
SUM OF ALL RESPONSES TO PHQ QUESTIONS 1-9: 0
SUM OF ALL RESPONSES TO PHQ QUESTIONS 1-9: 0

## 2024-01-05 ASSESSMENT — LIFESTYLE VARIABLES
HOW OFTEN DURING THE LAST YEAR HAVE YOU FAILED TO DO WHAT WAS NORMALLY EXPECTED FROM YOU BECAUSE OF DRINKING: 0
HAVE YOU OR SOMEONE ELSE BEEN INJURED AS A RESULT OF YOUR DRINKING: 0
HOW OFTEN DO YOU HAVE A DRINK CONTAINING ALCOHOL: 4 OR MORE TIMES A WEEK
HAS A RELATIVE, FRIEND, DOCTOR, OR ANOTHER HEALTH PROFESSIONAL EXPRESSED CONCERN ABOUT YOUR DRINKING OR SUGGESTED YOU CUT DOWN: 0
HOW OFTEN DURING THE LAST YEAR HAVE YOU FOUND THAT YOU WERE NOT ABLE TO STOP DRINKING ONCE YOU HAD STARTED: 0
HOW OFTEN DURING THE LAST YEAR HAVE YOU HAD A FEELING OF GUILT OR REMORSE AFTER DRINKING: 0
HOW MANY STANDARD DRINKS CONTAINING ALCOHOL DO YOU HAVE ON A TYPICAL DAY: 3 OR 4
HOW OFTEN DURING THE LAST YEAR HAVE YOU BEEN UNABLE TO REMEMBER WHAT HAPPENED THE NIGHT BEFORE BECAUSE YOU HAD BEEN DRINKING: 0
HOW OFTEN DURING THE LAST YEAR HAVE YOU NEEDED AN ALCOHOLIC DRINK FIRST THING IN THE MORNING TO GET YOURSELF GOING AFTER A NIGHT OF HEAVY DRINKING: 0

## 2024-01-05 NOTE — PROGRESS NOTES
(KENALOG) 0.147 MG/GM topical spray Apply topically 2 times daily.  Patient not taking: Reported on 1/5/2024  Anna Freeman MD       [facility] (Including outside providers/suppliers regularly involved in providing care):   Patient Care Team:  Anna Freeman MD as PCP - General  Anna Freeman MD as PCP - Empaneled Provider     Reviewed and updated this visit:  Tobacco  Allergies  Meds  Med Hx  Surg Hx  Soc Hx  Fam Hx

## 2024-01-05 NOTE — PATIENT INSTRUCTIONS
goal.  When exposed to the sun, use a sunscreen that protects against both UVA and UVB radiation with an SPF of 30 or greater. Reapply every 2 to 3 hours or after sweating, drying off with a towel, or swimming.  Always wear a seat belt when traveling in a car. Always wear a helmet when riding a bicycle or motorcycle.

## 2024-01-11 ENCOUNTER — HOSPITAL ENCOUNTER (OUTPATIENT)
Dept: GENERAL RADIOLOGY | Age: 64
Discharge: HOME | End: 2024-01-13
Payer: MEDICARE

## 2024-01-11 ENCOUNTER — HOSPITAL ENCOUNTER (OUTPATIENT)
Age: 64
Discharge: HOME | End: 2024-01-11
Payer: MEDICARE

## 2024-01-11 ENCOUNTER — HOSPITAL ENCOUNTER (OUTPATIENT)
Age: 64
Discharge: HOME | End: 2024-01-13
Payer: MEDICARE

## 2024-01-11 ENCOUNTER — TELEPHONE (OUTPATIENT)
Dept: ONCOLOGY | Age: 64
End: 2024-01-11

## 2024-01-11 DIAGNOSIS — I10 PRIMARY HYPERTENSION: ICD-10-CM

## 2024-01-11 DIAGNOSIS — M79.671 FOOT PAIN, BILATERAL: ICD-10-CM

## 2024-01-11 DIAGNOSIS — M79.672 FOOT PAIN, BILATERAL: ICD-10-CM

## 2024-01-11 DIAGNOSIS — Z13.1 SCREENING FOR DIABETES MELLITUS: ICD-10-CM

## 2024-01-11 DIAGNOSIS — Z13.6 SCREENING FOR CARDIOVASCULAR CONDITION: ICD-10-CM

## 2024-01-11 LAB
ALBUMIN SERPL-MCNC: 4.4 G/DL (ref 3.5–5.2)
ALP SERPL-CCNC: 108 U/L (ref 35–104)
ALT SERPL-CCNC: 23 U/L (ref 5–33)
ANION GAP SERPL CALCULATED.3IONS-SCNC: 13 MMOL/L (ref 9–17)
AST SERPL-CCNC: 25 U/L
BASOPHILS # BLD: 0 K/UL (ref 0–0.2)
BASOPHILS NFR BLD: 0 % (ref 0–2)
BILIRUB SERPL-MCNC: 1 MG/DL (ref 0.3–1.2)
BUN SERPL-MCNC: 13 MG/DL (ref 8–23)
CALCIUM SERPL-MCNC: 9.8 MG/DL (ref 8.6–10.4)
CHLORIDE SERPL-SCNC: 99 MMOL/L (ref 98–107)
CHOLEST SERPL-MCNC: 152 MG/DL
CHOLESTEROL/HDL RATIO: 2
CO2 SERPL-SCNC: 26 MMOL/L (ref 20–31)
CREAT SERPL-MCNC: 0.9 MG/DL (ref 0.5–0.9)
EOSINOPHIL # BLD: 0.1 K/UL (ref 0–0.4)
EOSINOPHILS RELATIVE PERCENT: 1 % (ref 0–4)
ERYTHROCYTE [DISTWIDTH] IN BLOOD BY AUTOMATED COUNT: 13 % (ref 11.5–14.9)
FOLATE SERPL-MCNC: >20 NG/ML
GFR SERPL CREATININE-BSD FRML MDRD: >60 ML/MIN/1.73M2
GLUCOSE SERPL-MCNC: 109 MG/DL (ref 70–99)
HCT VFR BLD AUTO: 41 % (ref 36–46)
HDLC SERPL-MCNC: 75 MG/DL
HGB BLD-MCNC: 13.8 G/DL (ref 12–16)
LDLC SERPL CALC-MCNC: 50 MG/DL (ref 0–130)
LYMPHOCYTES NFR BLD: 1.6 K/UL (ref 1–4.8)
LYMPHOCYTES RELATIVE PERCENT: 17 % (ref 24–44)
MCH RBC QN AUTO: 32.3 PG (ref 26–34)
MCHC RBC AUTO-ENTMCNC: 33.6 G/DL (ref 31–37)
MCV RBC AUTO: 96.3 FL (ref 80–100)
MONOCYTES NFR BLD: 1 K/UL (ref 0.1–1.3)
MONOCYTES NFR BLD: 10 % (ref 1–7)
NEUTROPHILS NFR BLD: 72 % (ref 36–66)
NEUTS SEG NFR BLD: 7 K/UL (ref 1.3–9.1)
PLATELET # BLD AUTO: 340 K/UL (ref 150–450)
PMV BLD AUTO: 7.3 FL (ref 6–12)
POTASSIUM SERPL-SCNC: 4.7 MMOL/L (ref 3.7–5.3)
PROT SERPL-MCNC: 7.1 G/DL (ref 6.4–8.3)
RBC # BLD AUTO: 4.26 M/UL (ref 4–5.2)
SODIUM SERPL-SCNC: 138 MMOL/L (ref 135–144)
TRIGL SERPL-MCNC: 134 MG/DL
VIT B12 SERPL-MCNC: 578 PG/ML (ref 232–1245)
WBC OTHER # BLD: 9.7 K/UL (ref 3.5–11)

## 2024-01-11 PROCEDURE — 73630 X-RAY EXAM OF FOOT: CPT

## 2024-01-11 PROCEDURE — 82607 VITAMIN B-12: CPT

## 2024-01-11 PROCEDURE — 80061 LIPID PANEL: CPT

## 2024-01-11 PROCEDURE — 85025 COMPLETE CBC W/AUTO DIFF WBC: CPT

## 2024-01-11 PROCEDURE — 82746 ASSAY OF FOLIC ACID SERUM: CPT

## 2024-01-11 PROCEDURE — 80053 COMPREHEN METABOLIC PANEL: CPT

## 2024-01-11 PROCEDURE — 36415 COLL VENOUS BLD VENIPUNCTURE: CPT

## 2024-01-11 NOTE — RESULT ENCOUNTER NOTE
Adv pt results show broken 4th toe- if hurting can jessie tape it to 3rd toe, otherwise, just have to wait for it to get better.

## 2024-01-17 ENCOUNTER — HOSPITAL ENCOUNTER (OUTPATIENT)
Dept: SLEEP CENTER | Age: 64
Discharge: HOME | End: 2024-01-19
Payer: MEDICARE

## 2024-01-17 DIAGNOSIS — R06.83 SNORING: ICD-10-CM

## 2024-01-17 DIAGNOSIS — G47.33 OBSTRUCTIVE SLEEP APNEA SYNDROME: ICD-10-CM

## 2024-01-17 PROCEDURE — G0399 HOME SLEEP TEST/TYPE 3 PORTA: HCPCS

## 2024-01-18 ENCOUNTER — HOSPITAL ENCOUNTER (OUTPATIENT)
Dept: WOMENS IMAGING | Age: 64
Discharge: HOME | End: 2024-01-20
Payer: MEDICARE

## 2024-01-18 ENCOUNTER — HOSPITAL ENCOUNTER (OUTPATIENT)
Dept: CT IMAGING | Age: 64
Discharge: HOME | End: 2024-01-20
Payer: MEDICARE

## 2024-01-18 VITALS — HEIGHT: 60 IN | WEIGHT: 191 LBS | BODY MASS INDEX: 37.5 KG/M2

## 2024-01-18 DIAGNOSIS — Z78.0 MENOPAUSE: ICD-10-CM

## 2024-01-18 DIAGNOSIS — Z87.891 PERSONAL HISTORY OF TOBACCO USE: ICD-10-CM

## 2024-01-18 DIAGNOSIS — Z12.31 ENCOUNTER FOR SCREENING MAMMOGRAM FOR BREAST CANCER: ICD-10-CM

## 2024-01-18 PROCEDURE — 77063 BREAST TOMOSYNTHESIS BI: CPT

## 2024-01-18 PROCEDURE — 77080 DXA BONE DENSITY AXIAL: CPT

## 2024-01-18 PROCEDURE — 71271 CT THORAX LUNG CANCER SCR C-: CPT

## 2024-01-28 LAB — STATUS: NORMAL

## 2024-01-29 DIAGNOSIS — G47.33 OSA (OBSTRUCTIVE SLEEP APNEA): Primary | ICD-10-CM

## 2024-02-19 RX ORDER — OMEPRAZOLE 20 MG/1
20 CAPSULE, DELAYED RELEASE ORAL DAILY
Qty: 100 CAPSULE | Refills: 2 | Status: SHIPPED | OUTPATIENT
Start: 2024-02-19

## 2024-03-20 ENCOUNTER — OFFICE VISIT (OUTPATIENT)
Dept: PRIMARY CARE CLINIC | Age: 64
End: 2024-03-20
Payer: MEDICARE

## 2024-03-20 VITALS
BODY MASS INDEX: 39.03 KG/M2 | OXYGEN SATURATION: 98 % | WEIGHT: 198.8 LBS | SYSTOLIC BLOOD PRESSURE: 130 MMHG | HEART RATE: 67 BPM | DIASTOLIC BLOOD PRESSURE: 76 MMHG | HEIGHT: 60 IN

## 2024-03-20 DIAGNOSIS — G47.33 OSA ON CPAP: Primary | ICD-10-CM

## 2024-03-20 DIAGNOSIS — F34.1 DYSTHYMIA: ICD-10-CM

## 2024-03-20 DIAGNOSIS — M81.0 AGE-RELATED OSTEOPOROSIS WITHOUT CURRENT PATHOLOGICAL FRACTURE: ICD-10-CM

## 2024-03-20 DIAGNOSIS — I10 PRIMARY HYPERTENSION: ICD-10-CM

## 2024-03-20 PROCEDURE — 1123F ACP DISCUSS/DSCN MKR DOCD: CPT | Performed by: FAMILY MEDICINE

## 2024-03-20 PROCEDURE — 99213 OFFICE O/P EST LOW 20 MIN: CPT | Performed by: FAMILY MEDICINE

## 2024-03-20 PROCEDURE — 3075F SYST BP GE 130 - 139MM HG: CPT | Performed by: FAMILY MEDICINE

## 2024-03-20 PROCEDURE — 3078F DIAST BP <80 MM HG: CPT | Performed by: FAMILY MEDICINE

## 2024-03-20 ASSESSMENT — ENCOUNTER SYMPTOMS
SHORTNESS OF BREATH: 0
NAUSEA: 0
VOMITING: 0
COUGH: 0

## 2024-05-08 ENCOUNTER — OFFICE VISIT (OUTPATIENT)
Dept: PRIMARY CARE CLINIC | Age: 64
End: 2024-05-08

## 2024-05-08 ENCOUNTER — HOSPITAL ENCOUNTER (OUTPATIENT)
Age: 64
Setting detail: SPECIMEN
Discharge: HOME | End: 2024-05-08

## 2024-05-08 VITALS
HEART RATE: 97 BPM | WEIGHT: 203.8 LBS | SYSTOLIC BLOOD PRESSURE: 128 MMHG | DIASTOLIC BLOOD PRESSURE: 82 MMHG | BODY MASS INDEX: 40.01 KG/M2 | HEIGHT: 60 IN | OXYGEN SATURATION: 98 %

## 2024-05-08 DIAGNOSIS — Z12.4 SCREENING FOR CERVICAL CANCER: Primary | ICD-10-CM

## 2024-05-08 DIAGNOSIS — G47.33 OSA ON CPAP: ICD-10-CM

## 2024-05-08 DIAGNOSIS — I10 PRIMARY HYPERTENSION: ICD-10-CM

## 2024-05-08 DIAGNOSIS — I25.119 ATHEROSCLEROSIS OF NATIVE CORONARY ARTERY OF NATIVE HEART WITH ANGINA PECTORIS (HCC): ICD-10-CM

## 2024-05-08 DIAGNOSIS — E66.01 SEVERE OBESITY (BMI 35.0-39.9) WITH COMORBIDITY (HCC): ICD-10-CM

## 2024-05-08 ASSESSMENT — ENCOUNTER SYMPTOMS
WHEEZING: 0
COUGH: 0
EYE REDNESS: 0
RHINORRHEA: 0
NAUSEA: 0
DIARRHEA: 0
ABDOMINAL PAIN: 0
VOMITING: 0
SHORTNESS OF BREATH: 0
EYE DISCHARGE: 0
SORE THROAT: 0

## 2024-05-08 NOTE — PROGRESS NOTES
[facility]  [facility] PRIMARY CARE  [address]  [facility] [place] [address]  Dept: [phone]  Dept Fax: [phone]    Sofia Guerrais a 63 y.o. female who presents today for her physical.  Sofia Guerra is c/o of   Chief Complaint   Patient presents with    Gynecologic Exam     Pt here today for pap smear       HPI:     Gynecologic Exam  Pertinent negatives include no abdominal pain, chills, diarrhea, dysuria, fever, frequency, headaches, nausea, sore throat or vomiting.     No issues with meds.  Prozac and wellbutrin help with ups and downs.  No history of abnormal paps.      Contraception:  Menstrual history:  OB History    Para Term  AB Living   2 2 2         SAB IAB Ectopic Molar Multiple Live Births             2      # Outcome Date GA Lbr Roverto/2nd Weight Sex Delivery Anes PTL Lv   2 Term            1 Term                No components found for: \"LDLCHOLESTEROL\", \"LDLCALC\"    (goal LDL is <100)   AST (U/L)   Date Value   2024 25     ALT (U/L)   Date Value   2024 23     BUN (mg/dL)   Date Value   2024 13       Past Medical History:   Diagnosis Date    Alcohol dependence (HCC)     CAD S/P percutaneous coronary angioplasty 12    LAD , ANGELINE    Depression     Dyslipidemia     HTN (hypertension)     Left ventricular diastolic dysfunction 2013    Obesity     Seborrheic keratoses, inflamed 2016    Skin sensation disturbance 2016     Past Surgical History:   Procedure Laterality Date     SECTION      x2    CORONARY ANGIOPLASTY WITH STENT PLACEMENT      MYRINGOTOMY AND TYMPANOSTOMY TUBE PLACEMENT      TONGUE BIOPSY  2019    oral surgeon       Family History   Problem Relation Age of Onset    Macular Degen Mother     Hypertension Mother     Other Father         venous insuff    Hypertension Father     Rheum Arthritis Half-Sister         thinks it is RA    Breast Cancer Maternal Aunt 50       Social History     Tobacco Use    Smoking

## 2024-05-11 LAB
HPV I/H RISK 4 DNA CVX QL NAA+PROBE: DETECTED
HPV SAMPLE: ABNORMAL
HPV, INTERPRETATION: ABNORMAL
HPV16 DNA CVX QL NAA+PROBE: NOT DETECTED
HPV18 DNA CVX QL NAA+PROBE: NOT DETECTED
SPECIMEN DESCRIPTION: ABNORMAL

## 2024-05-13 RX ORDER — BUPROPION HYDROCHLORIDE 150 MG/1
150 TABLET ORAL EVERY MORNING
Qty: 90 TABLET | Refills: 3 | Status: SHIPPED | OUTPATIENT
Start: 2024-05-13

## 2024-05-13 RX ORDER — FLUOXETINE HYDROCHLORIDE 40 MG/1
40 CAPSULE ORAL DAILY
Qty: 90 CAPSULE | Refills: 3 | Status: SHIPPED | OUTPATIENT
Start: 2024-05-13

## 2024-05-21 LAB — CYTOLOGY REPORT: NORMAL

## 2024-05-22 ENCOUNTER — PATIENT MESSAGE (OUTPATIENT)
Dept: PRIMARY CARE CLINIC | Age: 64
End: 2024-05-22

## 2024-06-18 RX ORDER — FLUTICASONE PROPIONATE 50 MCG
SPRAY, SUSPENSION (ML) NASAL
Qty: 32 G | Refills: 1 | Status: SHIPPED | OUTPATIENT
Start: 2024-06-18

## 2024-06-18 NOTE — TELEPHONE ENCOUNTER
LAST VISIT:   5/8/2024     Future Appointments   Date Time Provider Department Center   9/9/2024  9:45 AM [facility]

## 2024-09-09 ENCOUNTER — OFFICE VISIT (OUTPATIENT)
Dept: PRIMARY CARE CLINIC | Age: 64
End: 2024-09-09
Payer: MEDICARE

## 2024-09-09 VITALS
HEIGHT: 60 IN | OXYGEN SATURATION: 95 % | WEIGHT: 205.6 LBS | BODY MASS INDEX: 40.37 KG/M2 | SYSTOLIC BLOOD PRESSURE: 134 MMHG | DIASTOLIC BLOOD PRESSURE: 72 MMHG | HEART RATE: 63 BPM

## 2024-09-09 DIAGNOSIS — G47.33 OSA ON CPAP: ICD-10-CM

## 2024-09-09 DIAGNOSIS — L30.9 ECZEMA, UNSPECIFIED TYPE: ICD-10-CM

## 2024-09-09 DIAGNOSIS — Z12.11 SCREENING FOR COLON CANCER: ICD-10-CM

## 2024-09-09 DIAGNOSIS — B97.7 HPV IN FEMALE: ICD-10-CM

## 2024-09-09 DIAGNOSIS — I10 PRIMARY HYPERTENSION: Primary | ICD-10-CM

## 2024-09-09 DIAGNOSIS — M54.50 ACUTE BILATERAL LOW BACK PAIN WITHOUT SCIATICA: ICD-10-CM

## 2024-09-09 DIAGNOSIS — N30.01 ACUTE CYSTITIS WITH HEMATURIA: ICD-10-CM

## 2024-09-09 PROBLEM — N30.00 ACUTE CYSTITIS WITHOUT HEMATURIA: Status: ACTIVE | Noted: 2024-09-09

## 2024-09-09 PROCEDURE — 3078F DIAST BP <80 MM HG: CPT | Performed by: FAMILY MEDICINE

## 2024-09-09 PROCEDURE — 3075F SYST BP GE 130 - 139MM HG: CPT | Performed by: FAMILY MEDICINE

## 2024-09-09 PROCEDURE — 1123F ACP DISCUSS/DSCN MKR DOCD: CPT | Performed by: FAMILY MEDICINE

## 2024-09-09 PROCEDURE — 99213 OFFICE O/P EST LOW 20 MIN: CPT | Performed by: FAMILY MEDICINE

## 2024-09-09 RX ORDER — DESOXIMETASONE 0.5 MG/G
CREAM TOPICAL
Qty: 60 G | Refills: 0 | Status: SHIPPED | OUTPATIENT
Start: 2024-09-09

## 2024-10-06 LAB — NONINV COLON CA DNA+OCC BLD SCRN STL QL: NEGATIVE

## 2024-11-19 RX ORDER — ATENOLOL 25 MG/1
25 TABLET ORAL DAILY
Qty: 100 TABLET | Refills: 2 | Status: SHIPPED | OUTPATIENT
Start: 2024-11-19

## 2024-11-19 NOTE — TELEPHONE ENCOUNTER
LAST VISIT:   9/9/2024     Future Appointments   Date Time Provider Department Center   12/12/2024  9:15 AM [facility]   5/12/2025 11:00 AM Patricia Mccarthy, APRN - NP [email or web address]       Pharmacy verified? Yes       [facility] - [place], [place] - [address] [place] [address] - P [phone] - F [phone]  [address]  [address]  [facility] [address]  Phone: [phone] Fax: [phone]

## 2024-11-29 RX ORDER — FLUTICASONE PROPIONATE 50 MCG
SPRAY, SUSPENSION (ML) NASAL
Qty: 32 G | Refills: 1 | Status: SHIPPED | OUTPATIENT
Start: 2024-11-29

## 2024-12-12 ENCOUNTER — OFFICE VISIT (OUTPATIENT)
Dept: PRIMARY CARE CLINIC | Age: 64
End: 2024-12-12

## 2024-12-12 VITALS
WEIGHT: 210 LBS | HEART RATE: 60 BPM | BODY MASS INDEX: 41.23 KG/M2 | DIASTOLIC BLOOD PRESSURE: 78 MMHG | SYSTOLIC BLOOD PRESSURE: 136 MMHG | OXYGEN SATURATION: 97 % | HEIGHT: 60 IN

## 2024-12-12 DIAGNOSIS — I10 PRIMARY HYPERTENSION: ICD-10-CM

## 2024-12-12 DIAGNOSIS — R06.02 SHORTNESS OF BREATH: ICD-10-CM

## 2024-12-12 DIAGNOSIS — Z98.61 CAD S/P PERCUTANEOUS CORONARY ANGIOPLASTY: Primary | ICD-10-CM

## 2024-12-12 DIAGNOSIS — I25.10 CORONARY ARTERY DISEASE INVOLVING NATIVE CORONARY ARTERY OF NATIVE HEART WITHOUT ANGINA PECTORIS: ICD-10-CM

## 2024-12-12 DIAGNOSIS — I25.10 CAD S/P PERCUTANEOUS CORONARY ANGIOPLASTY: Primary | ICD-10-CM

## 2024-12-12 RX ORDER — DICYCLOMINE HYDROCHLORIDE 10 MG/1
10 CAPSULE ORAL EVERY 6 HOURS PRN
Qty: 90 CAPSULE | Refills: 0 | Status: SHIPPED | OUTPATIENT
Start: 2024-12-12

## 2024-12-12 ASSESSMENT — PATIENT HEALTH QUESTIONNAIRE - PHQ9
1. LITTLE INTEREST OR PLEASURE IN DOING THINGS: SEVERAL DAYS
SUM OF ALL RESPONSES TO PHQ9 QUESTIONS 1 & 2: 2
9. THOUGHTS THAT YOU WOULD BE BETTER OFF DEAD, OR OF HURTING YOURSELF: NOT AT ALL
5. POOR APPETITE OR OVEREATING: NOT AT ALL
8. MOVING OR SPEAKING SO SLOWLY THAT OTHER PEOPLE COULD HAVE NOTICED. OR THE OPPOSITE, BEING SO FIGETY OR RESTLESS THAT YOU HAVE BEEN MOVING AROUND A LOT MORE THAN USUAL: NOT AT ALL
SUM OF ALL RESPONSES TO PHQ QUESTIONS 1-9: 2
SUM OF ALL RESPONSES TO PHQ QUESTIONS 1-9: 2
3. TROUBLE FALLING OR STAYING ASLEEP: NOT AT ALL
6. FEELING BAD ABOUT YOURSELF - OR THAT YOU ARE A FAILURE OR HAVE LET YOURSELF OR YOUR FAMILY DOWN: NOT AT ALL
10. IF YOU CHECKED OFF ANY PROBLEMS, HOW DIFFICULT HAVE THESE PROBLEMS MADE IT FOR YOU TO DO YOUR WORK, TAKE CARE OF THINGS AT HOME, OR GET ALONG WITH OTHER PEOPLE: NOT DIFFICULT AT ALL
SUM OF ALL RESPONSES TO PHQ QUESTIONS 1-9: 2
4. FEELING TIRED OR HAVING LITTLE ENERGY: NOT AT ALL
7. TROUBLE CONCENTRATING ON THINGS, SUCH AS READING THE NEWSPAPER OR WATCHING TELEVISION: NOT AT ALL
2. FEELING DOWN, DEPRESSED OR HOPELESS: SEVERAL DAYS
SUM OF ALL RESPONSES TO PHQ QUESTIONS 1-9: 2

## 2024-12-12 ASSESSMENT — ENCOUNTER SYMPTOMS
WHEEZING: 0
EYE DISCHARGE: 0
RHINORRHEA: 0
SHORTNESS OF BREATH: 0
EYE REDNESS: 0
NAUSEA: 0
COUGH: 0
ABDOMINAL PAIN: 0
VOMITING: 0
SORE THROAT: 0
DIARRHEA: 0

## 2024-12-12 NOTE — PROGRESS NOTES
[facility]  [facility] PRIMARY CARE  [address] [address]  [facility] [address]  Dept: [phone]    Sofia Guerra is a 64 y.o. female Established patient, who presents today for her medical conditions/complaints as noted below.      Chief Complaint   Patient presents with    Hypertension     Follow-up    Flu Vaccine     Would like to discuss.       HPI:   Pt doing okay.  SOB with up steps or if she walks too fast.  NO CP or other symptoms.  Goes away pretty quickly.      Reviewed prior notes: None   Reviewed previous:  Labs    No components found for: \"LDLCHOLESTEROL\", \"LDLCALC\"    (goal LDL is <100)   AST (U/L)   Date Value   2024 25     ALT (U/L)   Date Value   2024 23     BUN (mg/dL)   Date Value   2024 13     TSH (mIU/L)   Date Value   2020 2.38     BP Readings from Last 3 Encounters:   24 136/78   24 134/72   24 128/82          (goal 120/80)    Past Medical History:   Diagnosis Date    Alcohol dependence (HCC)     CAD S/P percutaneous coronary angioplasty 12    LAD , ANGELINE    Depression     Dyslipidemia     HTN (hypertension)     Left ventricular diastolic dysfunction 2013    Obesity     Seborrheic keratoses, inflamed 2016    Skin sensation disturbance 2016      Past Surgical History:   Procedure Laterality Date     SECTION      x2    CORONARY ANGIOPLASTY WITH STENT PLACEMENT      MYRINGOTOMY AND TYMPANOSTOMY TUBE PLACEMENT      TONGUE BIOPSY  2019    oral surgeon       Family History   Problem Relation Age of Onset    Macular Degen Mother     Hypertension Mother     Other Father         venous insuff    Hypertension Father     Rheum Arthritis Half-Sister         thinks it is RA    Breast Cancer Maternal Aunt 50       Social History     Tobacco Use    Smoking status: Former     Current packs/day: 0.00     Average packs/day: 1 pack/day for 40.0 years (40.0 ttl pk-yrs)     Types: Cigarettes     Start date: 1972

## 2024-12-12 NOTE — ASSESSMENT & PLAN NOTE
Some SOB with activity but no other symptoms  If any change pt to let me know.  May need stress test

## 2024-12-16 ENCOUNTER — TELEPHONE (OUTPATIENT)
Dept: PRIMARY CARE CLINIC | Age: 64
End: 2024-12-16

## 2025-02-12 ENCOUNTER — HOSPITAL ENCOUNTER (OUTPATIENT)
Dept: NUCLEAR MEDICINE | Age: 65
Discharge: HOME | End: 2025-02-14
Payer: MEDICARE

## 2025-02-12 ENCOUNTER — HOSPITAL ENCOUNTER (OUTPATIENT)
Age: 65
Discharge: HOME | End: 2025-02-14
Payer: MEDICARE

## 2025-02-12 DIAGNOSIS — R06.02 SHORTNESS OF BREATH: ICD-10-CM

## 2025-02-12 DIAGNOSIS — I25.10 CORONARY ARTERY DISEASE INVOLVING NATIVE CORONARY ARTERY OF NATIVE HEART WITHOUT ANGINA PECTORIS: ICD-10-CM

## 2025-02-12 LAB
NUC STRESS EJECTION FRACTION: 56 %
NUC STRESS LV EDV: 76 ML (ref 56–104)
STRESS BASELINE DIAS BP: 68 MMHG
STRESS BASELINE HR: 61 BPM
STRESS BASELINE SYS BP: 136 MMHG
STRESS ESTIMATED WORKLOAD: 1 METS
STRESS PEAK DIAS BP: 65 MMHG
STRESS PEAK SYS BP: 133 MMHG
STRESS PERCENT HR ACHIEVED: 53 %
STRESS POST PEAK HR: 81 BPM
STRESS RATE PRESSURE PRODUCT: NORMAL BPM*MMHG
STRESS ST DEPRESSION: 0 MM
STRESS STAGE RECOVERY 1 BP: NORMAL MMHG
STRESS STAGE RECOVERY 1 DURATION: 1 MIN:SEC
STRESS STAGE RECOVERY 1 HR: 75 BPM
STRESS STAGE RECOVERY 2 BP: NORMAL MMHG
STRESS STAGE RECOVERY 2 DURATION: 6 MIN:SEC
STRESS STAGE RECOVERY 2 HR: 72 BPM
STRESS TARGET HR: 154 BPM
TID: 1.07

## 2025-02-12 PROCEDURE — 93018 CV STRESS TEST I&R ONLY: CPT | Performed by: INTERNAL MEDICINE

## 2025-02-12 PROCEDURE — 6360000002 HC RX W HCPCS: Performed by: NURSE PRACTITIONER

## 2025-02-12 PROCEDURE — 78452 HT MUSCLE IMAGE SPECT MULT: CPT

## 2025-02-12 PROCEDURE — A9500 TC99M SESTAMIBI: HCPCS | Performed by: FAMILY MEDICINE

## 2025-02-12 PROCEDURE — 2500000003 HC RX 250 WO HCPCS: Performed by: FAMILY MEDICINE

## 2025-02-12 PROCEDURE — 3430000000 HC RX DIAGNOSTIC RADIOPHARMACEUTICAL: Performed by: FAMILY MEDICINE

## 2025-02-12 PROCEDURE — 93016 CV STRESS TEST SUPVJ ONLY: CPT | Performed by: INTERNAL MEDICINE

## 2025-02-12 PROCEDURE — 93017 CV STRESS TEST TRACING ONLY: CPT

## 2025-02-12 RX ORDER — NITROGLYCERIN 0.4 MG/1
0.4 TABLET SUBLINGUAL EVERY 5 MIN PRN
Status: ACTIVE | OUTPATIENT
Start: 2025-02-12 | End: 2025-02-12

## 2025-02-12 RX ORDER — ATROPINE SULFATE 0.1 MG/ML
0.5 INJECTION INTRAVENOUS EVERY 5 MIN PRN
Status: ACTIVE | OUTPATIENT
Start: 2025-02-12 | End: 2025-02-12

## 2025-02-12 RX ORDER — REGADENOSON 0.08 MG/ML
0.4 INJECTION, SOLUTION INTRAVENOUS
Status: COMPLETED | OUTPATIENT
Start: 2025-02-12 | End: 2025-02-12

## 2025-02-12 RX ORDER — METOPROLOL TARTRATE 1 MG/ML
5 INJECTION, SOLUTION INTRAVENOUS EVERY 5 MIN PRN
Status: ACTIVE | OUTPATIENT
Start: 2025-02-12 | End: 2025-02-12

## 2025-02-12 RX ORDER — TETRAKIS(2-METHOXYISOBUTYLISOCYANIDE)COPPER(I) TETRAFLUOROBORATE 1 MG/ML
10 INJECTION, POWDER, LYOPHILIZED, FOR SOLUTION INTRAVENOUS
Status: COMPLETED | OUTPATIENT
Start: 2025-02-12 | End: 2025-02-12

## 2025-02-12 RX ORDER — SODIUM CHLORIDE 0.9 % (FLUSH) 0.9 %
10 SYRINGE (ML) INJECTION PRN
Status: DISCONTINUED | OUTPATIENT
Start: 2025-02-12 | End: 2025-02-15 | Stop reason: HOSPADM

## 2025-02-12 RX ORDER — ALBUTEROL SULFATE 90 UG/1
2 INHALANT RESPIRATORY (INHALATION) PRN
Status: ACTIVE | OUTPATIENT
Start: 2025-02-12 | End: 2025-02-12

## 2025-02-12 RX ORDER — TETRAKIS(2-METHOXYISOBUTYLISOCYANIDE)COPPER(I) TETRAFLUOROBORATE 1 MG/ML
30 INJECTION, POWDER, LYOPHILIZED, FOR SOLUTION INTRAVENOUS
Status: COMPLETED | OUTPATIENT
Start: 2025-02-12 | End: 2025-02-12

## 2025-02-12 RX ORDER — SODIUM CHLORIDE 9 MG/ML
500 INJECTION, SOLUTION INTRAVENOUS CONTINUOUS PRN
Status: ACTIVE | OUTPATIENT
Start: 2025-02-12 | End: 2025-02-12

## 2025-02-12 RX ORDER — SODIUM CHLORIDE 0.9 % (FLUSH) 0.9 %
5-40 SYRINGE (ML) INJECTION PRN
Status: ACTIVE | OUTPATIENT
Start: 2025-02-12 | End: 2025-02-12

## 2025-02-12 RX ADMIN — Medication 35.7 MILLICURIE: at 08:35

## 2025-02-12 RX ADMIN — Medication 11.3 MILLICURIE: at 06:53

## 2025-02-12 RX ADMIN — REGADENOSON 0.4 MG: 0.08 INJECTION, SOLUTION INTRAVENOUS at 08:33

## 2025-02-12 RX ADMIN — SODIUM CHLORIDE, PRESERVATIVE FREE 10 ML: 5 INJECTION INTRAVENOUS at 06:53

## 2025-02-19 ENCOUNTER — PATIENT MESSAGE (OUTPATIENT)
Dept: PRIMARY CARE CLINIC | Age: 65
End: 2025-02-19

## 2025-02-19 PROBLEM — E66.01 MORBID (SEVERE) OBESITY DUE TO EXCESS CALORIES: Status: ACTIVE | Noted: 2025-02-19

## 2025-03-12 ENCOUNTER — HOSPITAL ENCOUNTER (OUTPATIENT)
Age: 65
Setting detail: OUTPATIENT SURGERY
Discharge: HOME | End: 2025-03-12
Attending: INTERNAL MEDICINE | Admitting: INTERNAL MEDICINE
Payer: MEDICARE

## 2025-03-12 VITALS
OXYGEN SATURATION: 97 % | HEART RATE: 66 BPM | DIASTOLIC BLOOD PRESSURE: 66 MMHG | HEIGHT: 60 IN | RESPIRATION RATE: 18 BRPM | BODY MASS INDEX: 42.01 KG/M2 | WEIGHT: 214 LBS | TEMPERATURE: 97.9 F | SYSTOLIC BLOOD PRESSURE: 122 MMHG

## 2025-03-12 DIAGNOSIS — R07.9 CHEST PAIN: ICD-10-CM

## 2025-03-12 LAB
BUN BLD-MCNC: 14 MG/DL (ref 8–26)
CA-I BLD-SCNC: 1.27 MMOL/L (ref 1.15–1.33)
CHLORIDE BLD-SCNC: 106 MMOL/L (ref 98–107)
ECHO BSA: 2.03 M2
EGFR, POC: 81 ML/MIN/1.73M2
EKG ATRIAL RATE: 69 BPM
EKG P AXIS: -9 DEGREES
EKG P-R INTERVAL: 208 MS
EKG Q-T INTERVAL: 422 MS
EKG QRS DURATION: 72 MS
EKG QTC CALCULATION (BAZETT): 452 MS
EKG R AXIS: 44 DEGREES
EKG T AXIS: 14 DEGREES
EKG VENTRICULAR RATE: 69 BPM
GLUCOSE BLD-MCNC: 104 MG/DL (ref 74–100)
HCT VFR BLD AUTO: 39 % (ref 36–46)
PLATELET # BLD AUTO: 262 K/UL (ref 140–450)
POC CREATININE: 0.8 MG/DL (ref 0.51–1.19)
POC HEMOGLOBIN (CALC): 13.3 G/DL (ref 12–16)
POTASSIUM BLD-SCNC: 4.6 MMOL/L (ref 3.5–4.5)
SODIUM BLD-SCNC: 141 MMOL/L (ref 138–146)

## 2025-03-12 PROCEDURE — 84295 ASSAY OF SERUM SODIUM: CPT

## 2025-03-12 PROCEDURE — C9610 HC CATH CORONARY DRUG-DELIVERY: HCPCS | Performed by: INTERNAL MEDICINE

## 2025-03-12 PROCEDURE — 0913T PRQ TCAT THER RX NTRAC BALO1: CPT | Performed by: INTERNAL MEDICINE

## 2025-03-12 PROCEDURE — C1725 CATH, TRANSLUMIN NON-LASER: HCPCS | Performed by: INTERNAL MEDICINE

## 2025-03-12 PROCEDURE — 2500000003 HC RX 250 WO HCPCS: Performed by: INTERNAL MEDICINE

## 2025-03-12 PROCEDURE — 84520 ASSAY OF UREA NITROGEN: CPT

## 2025-03-12 PROCEDURE — 6360000004 HC RX CONTRAST MEDICATION: Performed by: INTERNAL MEDICINE

## 2025-03-12 PROCEDURE — 82565 ASSAY OF CREATININE: CPT

## 2025-03-12 PROCEDURE — 93005 ELECTROCARDIOGRAM TRACING: CPT | Performed by: INTERNAL MEDICINE

## 2025-03-12 PROCEDURE — 2580000003 HC RX 258: Performed by: INTERNAL MEDICINE

## 2025-03-12 PROCEDURE — 82435 ASSAY OF BLOOD CHLORIDE: CPT

## 2025-03-12 PROCEDURE — C1769 GUIDE WIRE: HCPCS | Performed by: INTERNAL MEDICINE

## 2025-03-12 PROCEDURE — 93010 ELECTROCARDIOGRAM REPORT: CPT | Performed by: INTERNAL MEDICINE

## 2025-03-12 PROCEDURE — 85014 HEMATOCRIT: CPT

## 2025-03-12 PROCEDURE — 99153 MOD SED SAME PHYS/QHP EA: CPT | Performed by: INTERNAL MEDICINE

## 2025-03-12 PROCEDURE — 2709999900 HC NON-CHARGEABLE SUPPLY: Performed by: INTERNAL MEDICINE

## 2025-03-12 PROCEDURE — C9601 PERC DRUG-EL COR STENT BRAN: HCPCS | Performed by: INTERNAL MEDICINE

## 2025-03-12 PROCEDURE — 92929 PR PRQ TRLUML CORONARY STENT W/ANGIO ADDL ART/BRNCH: CPT | Performed by: INTERNAL MEDICINE

## 2025-03-12 PROCEDURE — C9600 PERC DRUG-EL COR STENT SING: HCPCS | Performed by: INTERNAL MEDICINE

## 2025-03-12 PROCEDURE — 92921 HC PRQ CARDIAC ANGIO ADDL ART: CPT | Performed by: INTERNAL MEDICINE

## 2025-03-12 PROCEDURE — 0914T PRQ TCAT THR RX NTRC BAL SEP: CPT | Performed by: INTERNAL MEDICINE

## 2025-03-12 PROCEDURE — C1894 INTRO/SHEATH, NON-LASER: HCPCS | Performed by: INTERNAL MEDICINE

## 2025-03-12 PROCEDURE — 7100000010 HC PHASE II RECOVERY - FIRST 15 MIN: Performed by: INTERNAL MEDICINE

## 2025-03-12 PROCEDURE — 92928 PRQ TCAT PLMT NTRAC ST 1 LES: CPT | Performed by: INTERNAL MEDICINE

## 2025-03-12 PROCEDURE — C1887 CATHETER, GUIDING: HCPCS | Performed by: INTERNAL MEDICINE

## 2025-03-12 PROCEDURE — 93458 L HRT ARTERY/VENTRICLE ANGIO: CPT | Performed by: INTERNAL MEDICINE

## 2025-03-12 PROCEDURE — 82947 ASSAY GLUCOSE BLOOD QUANT: CPT

## 2025-03-12 PROCEDURE — 82330 ASSAY OF CALCIUM: CPT

## 2025-03-12 PROCEDURE — 99152 MOD SED SAME PHYS/QHP 5/>YRS: CPT | Performed by: INTERNAL MEDICINE

## 2025-03-12 PROCEDURE — 99151 MOD SED SAME PHYS/QHP <5 YRS: CPT | Performed by: INTERNAL MEDICINE

## 2025-03-12 PROCEDURE — 85049 AUTOMATED PLATELET COUNT: CPT

## 2025-03-12 PROCEDURE — 6360000002 HC RX W HCPCS: Performed by: INTERNAL MEDICINE

## 2025-03-12 PROCEDURE — 93463 DRUG ADMIN & HEMODYNMIC MEAS: CPT | Performed by: INTERNAL MEDICINE

## 2025-03-12 PROCEDURE — 84132 ASSAY OF SERUM POTASSIUM: CPT

## 2025-03-12 PROCEDURE — 99222 1ST HOSP IP/OBS MODERATE 55: CPT | Performed by: INTERNAL MEDICINE

## 2025-03-12 PROCEDURE — 7100000011 HC PHASE II RECOVERY - ADDTL 15 MIN: Performed by: INTERNAL MEDICINE

## 2025-03-12 PROCEDURE — C1874 STENT, COATED/COV W/DEL SYS: HCPCS | Performed by: INTERNAL MEDICINE

## 2025-03-12 DEVICE — STENT CORONARY ONYX FRONTIER RX 2.75X18 MM ZOTAROLIMUS ELUT: Type: IMPLANTABLE DEVICE | Status: FUNCTIONAL

## 2025-03-12 DEVICE — STENT CORONARY ONYX FRONTIER RX 3X22 MM ZOTAROLIMUS ELUT: Type: IMPLANTABLE DEVICE | Status: FUNCTIONAL

## 2025-03-12 RX ORDER — LIDOCAINE HYDROCHLORIDE 10 MG/ML
INJECTION, SOLUTION INFILTRATION; PERINEURAL PRN
Status: DISCONTINUED | OUTPATIENT
Start: 2025-03-12 | End: 2025-03-12 | Stop reason: HOSPADM

## 2025-03-12 RX ORDER — SODIUM CHLORIDE 9 MG/ML
INJECTION, SOLUTION INTRAVENOUS PRN
Status: DISCONTINUED | OUTPATIENT
Start: 2025-03-12 | End: 2025-03-12 | Stop reason: HOSPADM

## 2025-03-12 RX ORDER — SODIUM CHLORIDE 9 MG/ML
INJECTION, SOLUTION INTRAVENOUS CONTINUOUS
Status: DISCONTINUED | OUTPATIENT
Start: 2025-03-12 | End: 2025-03-12 | Stop reason: HOSPADM

## 2025-03-12 RX ORDER — SODIUM CHLORIDE 0.9 % (FLUSH) 0.9 %
5-40 SYRINGE (ML) INJECTION PRN
Status: DISCONTINUED | OUTPATIENT
Start: 2025-03-12 | End: 2025-03-12 | Stop reason: HOSPADM

## 2025-03-12 RX ORDER — BIVALIRUDIN 250 MG/5ML
INJECTION, POWDER, LYOPHILIZED, FOR SOLUTION INTRAVENOUS PRN
Status: DISCONTINUED | OUTPATIENT
Start: 2025-03-12 | End: 2025-03-12 | Stop reason: HOSPADM

## 2025-03-12 RX ORDER — MIDAZOLAM 1 MG/ML
INJECTION INTRAMUSCULAR; INTRAVENOUS PRN
Status: DISCONTINUED | OUTPATIENT
Start: 2025-03-12 | End: 2025-03-12 | Stop reason: HOSPADM

## 2025-03-12 RX ORDER — IOPAMIDOL 755 MG/ML
INJECTION, SOLUTION INTRAVASCULAR PRN
Status: DISCONTINUED | OUTPATIENT
Start: 2025-03-12 | End: 2025-03-12 | Stop reason: HOSPADM

## 2025-03-12 RX ORDER — HYDRALAZINE HYDROCHLORIDE 20 MG/ML
INJECTION INTRAMUSCULAR; INTRAVENOUS PRN
Status: DISCONTINUED | OUTPATIENT
Start: 2025-03-12 | End: 2025-03-12 | Stop reason: HOSPADM

## 2025-03-12 RX ORDER — NITROGLYCERIN 20 MG/100ML
INJECTION INTRAVENOUS PRN
Status: DISCONTINUED | OUTPATIENT
Start: 2025-03-12 | End: 2025-03-12 | Stop reason: HOSPADM

## 2025-03-12 RX ORDER — SODIUM CHLORIDE 0.9 % (FLUSH) 0.9 %
5-40 SYRINGE (ML) INJECTION EVERY 12 HOURS SCHEDULED
Status: DISCONTINUED | OUTPATIENT
Start: 2025-03-12 | End: 2025-03-12 | Stop reason: HOSPADM

## 2025-03-12 RX ADMIN — SODIUM CHLORIDE: 9 INJECTION, SOLUTION INTRAVENOUS at 12:14

## 2025-03-12 RX ADMIN — SODIUM CHLORIDE: 9 INJECTION, SOLUTION INTRAVENOUS at 14:33

## 2025-03-12 NOTE — H&P
H&P reviewed and updated as documented in the chart with no changes from last office visit 2/19/2025.    ASA  II    Mallampati  II    Electronically signed by Ana Maria Mcneill MD on 3/12/2025 at 12:52 PM      [facility]  [phone]

## 2025-03-12 NOTE — DISCHARGE INSTRUCTIONS
Percutaneous Coronary Intervention: What to Expect at Home  Your Recovery    Percutaneous coronary intervention (PCI) is the name for procedures that are used to open a narrowed or blocked coronary artery. The two most common PCI procedures are coronary angioplasty and coronary stent placement.  Your groin or arm may have a bruise and feel sore for a day or two after a percutaneous coronary intervention (PCI). You can do light activities around the house, but nothing strenuous for several days.  This care sheet gives you a general idea about how long it will take for you to recover. But each person recovers at a different pace. Follow the steps below to get better as quickly as possible.  How can you care for yourself at home?  Activity    If the doctor gave you a sedative:  For 24 hours, don't do anything that requires attention to detail, such as going to work, making important decisions, or signing any legal documents. It takes time for the medicine's effects to completely wear off.  For your safety, do not drive or operate any machinery that could be dangerous. Wait until the medicine wears off and you can think clearly and react easily.     Do not do strenuous exercise and do not lift, pull, or push anything heavy until your doctor says it is okay. This may be for a day or two. You can walk around the house and do light activity, such as cooking.     If the catheter was placed in your groin, try not to walk up stairs for the first couple of days.     If the catheter was placed in your arm near your wrist, do not bend your wrist deeply for the first couple of days. Be careful using your hand to get into and out of a chair or bed.     Carry your stent identification card with you at all times.     If your doctor recommends it, get more exercise. Walking is a good choice. Bit by bit, increase the amount you walk every day. Try for at least 30 minutes on most days of the week.   Diet    Drink plenty of  hours after the procedure.    Resume your medications unless otherwise instructed.

## 2025-03-12 NOTE — PROGRESS NOTES
Patient received post procedure to pacu 11. Assessment obtained.  Post cath pathway initiated. Right wrist  site with vascband intact. No hematoma noted. Restrictions reviewed with patient and friend.  Patient without complaints.

## 2025-03-12 NOTE — PROCEDURES
[facility]        Date:   3/12/2025  Patient name: Sofia Guerra  Date of admission:  3/12/2025 11:17 AM  MRN:   [id number]  YOB: 1960    CARDIAC CATHETERIZATION    Operators:  Primary: Ana Maria Mcneill MD.  Assistant:     Indications for cath: USA, Abnormal nuclear stress test, CAD with prior stenting    Procedure performed: Cardiac cath.    Access: Right Radial artery      Procedure: After informed consent was obtained with explanation of the risks and benefits, patient was brought to the cath lab. The right wrist was prepped and draped in sterile fashion. 1% lidocaine was used for local block. The Radial artery was cannulated with 6  Fr sheath with brisk arterial blood return. The side port was frequently flushed and aspirated with normal saline.    EBL is 25 mL    Dominance is left    Findings:    Left main: Normal with 0% stenosis.    LAD: 75% proximal stenosis, length is 18 mm, EDMAR-3 flow, underwent PCI with ANGELINE using 3.0 x 22 mm Leggett stent with 0% residual stenosis and EDMAR-3 flow.    Mid LAD with 80%  in-stent restenosis underwent drug-eluting balloon using 3.0 x 15 mm for up to 3 minutes with 0% residual stenosis and EDMAR-3 flow    First diagonal: 90% ostial stenosis jailed by the stent length is 8 mm, EDMAR-3 flow, underwent PTCA only using 2.0 x 12 mm balloon with 40% residual stenosis and EDMAR-3 flow    LCX: Luminal irregularities of 20 to 30%  OM 2: 90% proximal stenosis, length is 14 mm, EDMAR-3 flow, underwent PCI with ANGELINE using 2.75 x 18 mm Leggett stent with 0% residual stenosis and EDMAR-3 flow.    RCA: Minimal irregularities of 20 to 30%, small vessel    The LV gram was performed in the MEJIA 30 position.   LVEF: 55%. LV Wall Motion: Normal    Conclusions:  Multivessel CAD.  Successful PCI with ANGELINE to proximal LAD  Successful drug-eluting balloon to mid LAD in-stent restenosis  Successful PTCA only to ostial diagonal  Successful PCI with ANGELINE to proximal OM 2  Otherwise

## 2025-03-28 ENCOUNTER — OFFICE VISIT (OUTPATIENT)
Dept: PRIMARY CARE CLINIC | Age: 65
End: 2025-03-28

## 2025-03-28 VITALS
WEIGHT: 209.6 LBS | DIASTOLIC BLOOD PRESSURE: 70 MMHG | OXYGEN SATURATION: 97 % | HEART RATE: 61 BPM | BODY MASS INDEX: 41.15 KG/M2 | HEIGHT: 60 IN | SYSTOLIC BLOOD PRESSURE: 128 MMHG

## 2025-03-28 DIAGNOSIS — Z87.891 PERSONAL HISTORY OF TOBACCO USE: ICD-10-CM

## 2025-03-28 DIAGNOSIS — E78.2 MIXED HYPERLIPIDEMIA: ICD-10-CM

## 2025-03-28 DIAGNOSIS — Z00.00 INITIAL MEDICARE ANNUAL WELLNESS VISIT: Primary | ICD-10-CM

## 2025-03-28 LAB
ALBUMIN/GLOBULIN RATIO: 1.6 (ref 1–2.5)
ALBUMIN: 4.3 G/DL (ref 3.5–5.2)
ALP BLD-CCNC: 117 U/L (ref 35–104)
ALT SERPL-CCNC: 29 U/L (ref 10–35)
ANION GAP SERPL CALCULATED.3IONS-SCNC: 13 MMOL/L (ref 9–16)
AST SERPL-CCNC: 28 U/L (ref 10–35)
BILIRUB SERPL-MCNC: 0.9 MG/DL (ref 0–1.2)
BUN BLDV-MCNC: 19 MG/DL (ref 8–23)
CALCIUM SERPL-MCNC: 9.8 MG/DL (ref 8.6–10.4)
CHLORIDE BLD-SCNC: 97 MMOL/L (ref 98–107)
CO2: 27 MMOL/L (ref 20–31)
CREAT SERPL-MCNC: 1 MG/DL (ref 0.6–0.9)
GFR, ESTIMATED: 62 ML/MIN/1.73M2
GLUCOSE BLD-MCNC: 109 MG/DL (ref 74–99)
POTASSIUM SERPL-SCNC: 4.5 MMOL/L (ref 3.7–5.3)
SODIUM BLD-SCNC: 137 MMOL/L (ref 136–145)
TOTAL PROTEIN: 7 G/DL (ref 6.6–8.7)

## 2025-03-28 SDOH — ECONOMIC STABILITY: FOOD INSECURITY: WITHIN THE PAST 12 MONTHS, THE FOOD YOU BOUGHT JUST DIDN'T LAST AND YOU DIDN'T HAVE MONEY TO GET MORE.: NEVER TRUE

## 2025-03-28 SDOH — ECONOMIC STABILITY: FOOD INSECURITY: WITHIN THE PAST 12 MONTHS, YOU WORRIED THAT YOUR FOOD WOULD RUN OUT BEFORE YOU GOT MONEY TO BUY MORE.: NEVER TRUE

## 2025-03-28 ASSESSMENT — PATIENT HEALTH QUESTIONNAIRE - PHQ9
10. IF YOU CHECKED OFF ANY PROBLEMS, HOW DIFFICULT HAVE THESE PROBLEMS MADE IT FOR YOU TO DO YOUR WORK, TAKE CARE OF THINGS AT HOME, OR GET ALONG WITH OTHER PEOPLE: NOT DIFFICULT AT ALL
SUM OF ALL RESPONSES TO PHQ QUESTIONS 1-9: 2
7. TROUBLE CONCENTRATING ON THINGS, SUCH AS READING THE NEWSPAPER OR WATCHING TELEVISION: NOT AT ALL
9. THOUGHTS THAT YOU WOULD BE BETTER OFF DEAD, OR OF HURTING YOURSELF: NOT AT ALL
2. FEELING DOWN, DEPRESSED OR HOPELESS: SEVERAL DAYS
SUM OF ALL RESPONSES TO PHQ QUESTIONS 1-9: 2
4. FEELING TIRED OR HAVING LITTLE ENERGY: NOT AT ALL
5. POOR APPETITE OR OVEREATING: NOT AT ALL
1. LITTLE INTEREST OR PLEASURE IN DOING THINGS: SEVERAL DAYS
8. MOVING OR SPEAKING SO SLOWLY THAT OTHER PEOPLE COULD HAVE NOTICED. OR THE OPPOSITE, BEING SO FIGETY OR RESTLESS THAT YOU HAVE BEEN MOVING AROUND A LOT MORE THAN USUAL: NOT AT ALL
3. TROUBLE FALLING OR STAYING ASLEEP: NOT AT ALL
6. FEELING BAD ABOUT YOURSELF - OR THAT YOU ARE A FAILURE OR HAVE LET YOURSELF OR YOUR FAMILY DOWN: NOT AT ALL
SUM OF ALL RESPONSES TO PHQ QUESTIONS 1-9: 2
SUM OF ALL RESPONSES TO PHQ QUESTIONS 1-9: 2

## 2025-03-28 ASSESSMENT — LIFESTYLE VARIABLES
HAVE YOU OR SOMEONE ELSE BEEN INJURED AS A RESULT OF YOUR DRINKING: NO
HOW OFTEN DO YOU HAVE A DRINK CONTAINING ALCOHOL: 4 OR MORE TIMES A WEEK
HOW OFTEN DURING THE LAST YEAR HAVE YOU BEEN UNABLE TO REMEMBER WHAT HAPPENED THE NIGHT BEFORE BECAUSE YOU HAD BEEN DRINKING: NEVER
HOW MANY STANDARD DRINKS CONTAINING ALCOHOL DO YOU HAVE ON A TYPICAL DAY: 5 OR 6
HOW OFTEN DURING THE LAST YEAR HAVE YOU NEEDED AN ALCOHOLIC DRINK FIRST THING IN THE MORNING TO GET YOURSELF GOING AFTER A NIGHT OF HEAVY DRINKING: NEVER
HOW OFTEN DURING THE LAST YEAR HAVE YOU FAILED TO DO WHAT WAS NORMALLY EXPECTED FROM YOU BECAUSE OF DRINKING: NEVER
HOW OFTEN DURING THE LAST YEAR HAVE YOU FOUND THAT YOU WERE NOT ABLE TO STOP DRINKING ONCE YOU HAD STARTED: NEVER
HAS A RELATIVE, FRIEND, DOCTOR, OR ANOTHER HEALTH PROFESSIONAL EXPRESSED CONCERN ABOUT YOUR DRINKING OR SUGGESTED YOU CUT DOWN: NO
HOW OFTEN DURING THE LAST YEAR HAVE YOU HAD A FEELING OF GUILT OR REMORSE AFTER DRINKING: NEVER

## 2025-03-28 NOTE — PROGRESS NOTES
Medicare Annual Wellness Visit    Sofia Guerra is here for Medicare AWV (Pt here today for Medicare Annual Wellness Visit. Pt given three words to remember: phone, cheerful, blue. Time given to draw was 11:15.)    Assessment & Plan  1. Health maintenance.  - Her last mammogram was conducted in January 2024, with the next one scheduled for January 2026.  - Colon cancer screening is due in September 2027, and a bone density test is due in January 2026.  - She is eligible for tetanus and whooping cough vaccines, having received her last dose in 2012. She has not yet received the shingles vaccine.  - A CT scan for lung cancer screening will be ordered.    2. Medication management.  - Her potassium level was slightly elevated at 4.6, prompting a recheck. Her kidney function appears normal, and her ejection fraction is within the normal range at 60 to 65 percent.  - She reports that her medication was changed after her stent procedure. Prinzide (lisinopril with hydrochlorothiazide) was discontinued, and she is now on lisinopril and Lasix. This change has resulted in reduced swelling.  - She has been advised to maintain adequate hydration, as her kidney function is satisfactory. If she experiences increased fluid retention, swelling, or shortness of breath, she may need to reduce her fluid intake.  - She has been advised to monitor her blood sugar levels and kidney function, reduce alcohol consumption, and limit carbohydrate intake. A diet rich in low-fat meats has been recommended.    3. Unsteady gait.  - She reports feeling unsteady and worried about falling, especially in her two-story house.  - She has been advised to be cautious when ascending stairs and to consider using sticky mats or dots in her shower.  - Discussion included the impact of alcohol on balance and neuropathy in the feet, emphasizing the need for caution.  - A walking program has been recommended, with the expectation that her condition will

## 2025-03-31 ENCOUNTER — RESULTS FOLLOW-UP (OUTPATIENT)
Dept: PRIMARY CARE CLINIC | Age: 65
End: 2025-03-31

## 2025-04-01 RX ORDER — FUROSEMIDE 20 MG/1
20 TABLET ORAL DAILY
Qty: 90 TABLET | Refills: 1 | Status: SHIPPED | OUTPATIENT
Start: 2025-04-01

## 2025-04-01 RX ORDER — LISINOPRIL 10 MG/1
10 TABLET ORAL DAILY
Qty: 90 TABLET | Refills: 1 | Status: SHIPPED | OUTPATIENT
Start: 2025-04-01

## 2025-04-14 RX ORDER — BUPROPION HYDROCHLORIDE 150 MG/1
150 TABLET ORAL EVERY MORNING
Qty: 90 TABLET | Refills: 3 | Status: SHIPPED | OUTPATIENT
Start: 2025-04-14

## 2025-04-14 RX ORDER — FLUOXETINE HYDROCHLORIDE 40 MG/1
40 CAPSULE ORAL DAILY
Qty: 90 CAPSULE | Refills: 3 | Status: SHIPPED | OUTPATIENT
Start: 2025-04-14

## 2025-04-21 ENCOUNTER — PATIENT MESSAGE (OUTPATIENT)
Dept: PRIMARY CARE CLINIC | Age: 65
End: 2025-04-21

## 2025-05-08 ENCOUNTER — HOSPITAL ENCOUNTER (OUTPATIENT)
Dept: CT IMAGING | Age: 65
Discharge: HOME | End: 2025-05-10
Payer: MEDICARE

## 2025-05-08 DIAGNOSIS — Z87.891 PERSONAL HISTORY OF TOBACCO USE: ICD-10-CM

## 2025-05-08 PROCEDURE — 71271 CT THORAX LUNG CANCER SCR C-: CPT

## 2025-05-13 RX ORDER — FLUTICASONE PROPIONATE 50 MCG
SPRAY, SUSPENSION (ML) NASAL
Qty: 32 G | Refills: 1 | Status: SHIPPED | OUTPATIENT
Start: 2025-05-13

## 2025-05-13 RX ORDER — LISINOPRIL 10 MG/1
10 TABLET ORAL DAILY
Qty: 100 TABLET | Refills: 2 | Status: SHIPPED | OUTPATIENT
Start: 2025-05-13

## 2025-05-13 RX ORDER — FUROSEMIDE 20 MG/1
20 TABLET ORAL DAILY
Qty: 100 TABLET | Refills: 2 | Status: SHIPPED | OUTPATIENT
Start: 2025-05-13

## 2025-05-22 ENCOUNTER — TELEPHONE (OUTPATIENT)
Dept: ADMINISTRATIVE | Age: 65
End: 2025-05-22

## 2025-05-22 NOTE — TELEPHONE ENCOUNTER
New adult patient referral received for DX of   Otorrhea of right ear    Please review and call to Schedule

## 2025-06-05 ENCOUNTER — OFFICE VISIT (OUTPATIENT)
Dept: OBGYN CLINIC | Age: 65
End: 2025-06-05
Payer: MEDICARE

## 2025-06-05 ENCOUNTER — HOSPITAL ENCOUNTER (OUTPATIENT)
Age: 65
Setting detail: SPECIMEN
Discharge: HOME | End: 2025-06-05

## 2025-06-05 VITALS
SYSTOLIC BLOOD PRESSURE: 100 MMHG | HEIGHT: 60 IN | BODY MASS INDEX: 41.23 KG/M2 | WEIGHT: 210 LBS | DIASTOLIC BLOOD PRESSURE: 60 MMHG

## 2025-06-05 DIAGNOSIS — Z01.419 WELL FEMALE EXAM WITH ROUTINE GYNECOLOGICAL EXAM: Primary | ICD-10-CM

## 2025-06-05 DIAGNOSIS — Z11.51 SPECIAL SCREENING EXAMINATION FOR HUMAN PAPILLOMAVIRUS (HPV): ICD-10-CM

## 2025-06-05 DIAGNOSIS — Z12.31 ENCOUNTER FOR MAMMOGRAM TO ESTABLISH BASELINE MAMMOGRAM: ICD-10-CM

## 2025-06-05 PROCEDURE — 99387 INIT PM E/M NEW PAT 65+ YRS: CPT | Performed by: NURSE PRACTITIONER

## 2025-06-05 PROCEDURE — 1159F MED LIST DOCD IN RCRD: CPT | Performed by: NURSE PRACTITIONER

## 2025-06-05 PROCEDURE — 3078F DIAST BP <80 MM HG: CPT | Performed by: NURSE PRACTITIONER

## 2025-06-05 PROCEDURE — 3074F SYST BP LT 130 MM HG: CPT | Performed by: NURSE PRACTITIONER

## 2025-06-05 RX ORDER — OFLOXACIN 3 MG/ML
SOLUTION AURICULAR (OTIC)
COMMUNITY
Start: 2025-04-07

## 2025-06-05 NOTE — PROGRESS NOTES
History and Physical  [facility]  [facility] [address]  [place], [place]  [address] [phone]   Fax [phone]  Sofia Guerra  2025              64 y.o.  Chief Complaint   Patient presents with    Annual Exam    New Patient       No LMP recorded. Patient is postmenopausal.             Primary Care Physician: Anna Freeman MD    The patient was seen and examined. She has no chief complaint today and is here for her annual exam.  Her bowels are regular. There are no voiding complaints. She denies any bloating.  She denies vaginal discharge and was counseled on STD's and the need for barrier contraception.     HPI : Sofia Guerra is a 64 y.o. female     Annual exam  NO CC  Last pap smear neg + HPV other  States last pap smear prior was many years ago  Denies hx of abnormal pap smears  Currently going through a divorce     no Bloating  no Early Satiety  no Unexplained weight change of more than 15 lbs  no  PMB  no  PCB  ________________________________________________________________________  OB History    Para Term  AB Living   2 2 2 0 0 0   SAB IAB Ectopic Molar Multiple Live Births   0 0 0 0 0 2      # Outcome Date GA Lbr Roverto/2nd Weight Sex Type Anes PTL Lv   2 Term      CS-LVertical      1 Term      CS-LTranv        Past Medical History:   Diagnosis Date    Alcohol dependence (HCC)     CAD S/P percutaneous coronary angioplasty 12    LAD , ANGELINE    Depression     Dyslipidemia     HTN (hypertension)     Left ventricular diastolic dysfunction 2013    Obesity     Seborrheic keratoses, inflamed 2016    Skin sensation disturbance 2016                                                                   Past Surgical History:   Procedure Laterality Date    CARDIAC PROCEDURE N/A 2025    Left heart cath / coronary angiography performed by Ana Maria Mcneill MD at [facility] Cardiac Cath/IR    CARDIAC PROCEDURE N/A 2025

## 2025-06-06 LAB
HPV I/H RISK 4 DNA CVX QL NAA+PROBE: NORMAL
HPV SAMPLE: NORMAL
HPV, INTERPRETATION: NORMAL
HPV16 DNA CVX QL NAA+PROBE: NORMAL
HPV18 DNA CVX QL NAA+PROBE: NORMAL
SPECIMEN DESCRIPTION: NORMAL

## 2025-06-10 ENCOUNTER — RESULTS FOLLOW-UP (OUTPATIENT)
Dept: OBGYN CLINIC | Age: 65
End: 2025-06-10

## 2025-06-17 LAB — CYTOLOGY REPORT: NORMAL

## 2025-07-13 ENCOUNTER — OFFICE VISIT (OUTPATIENT)
Age: 65
End: 2025-07-13

## 2025-07-13 VITALS
HEART RATE: 70 BPM | WEIGHT: 200 LBS | BODY MASS INDEX: 39.27 KG/M2 | SYSTOLIC BLOOD PRESSURE: 116 MMHG | RESPIRATION RATE: 20 BRPM | DIASTOLIC BLOOD PRESSURE: 76 MMHG | OXYGEN SATURATION: 95 % | TEMPERATURE: 97.5 F | HEIGHT: 60 IN

## 2025-07-13 DIAGNOSIS — L03.115 CELLULITIS OF RIGHT LOWER LEG: ICD-10-CM

## 2025-07-13 DIAGNOSIS — S81.831A PUNCTURE WOUND OF RIGHT LOWER LEG, INITIAL ENCOUNTER: ICD-10-CM

## 2025-07-13 DIAGNOSIS — Z92.29 HISTORY OF ANTICOAGULANT USE: ICD-10-CM

## 2025-07-13 DIAGNOSIS — S80.11XA HEMATOMA OF RIGHT LOWER LEG: Primary | ICD-10-CM

## 2025-07-13 PROBLEM — H69.93 DYSFUNCTION OF BOTH EUSTACHIAN TUBES: Status: ACTIVE | Noted: 2023-10-31

## 2025-07-13 PROBLEM — J34.2 DEVIATED NASAL SEPTUM: Status: ACTIVE | Noted: 2023-12-14

## 2025-07-13 PROBLEM — H90.6 MIXED CONDUCTIVE AND SENSORINEURAL HEARING LOSS OF BOTH EARS: Status: ACTIVE | Noted: 2025-07-13

## 2025-07-13 RX ORDER — DOXYCYCLINE HYCLATE 100 MG
100 TABLET ORAL 2 TIMES DAILY
Qty: 14 TABLET | Refills: 0 | Status: SHIPPED | OUTPATIENT
Start: 2025-07-13 | End: 2025-07-20

## 2025-07-16 ENCOUNTER — HOSPITAL ENCOUNTER (OUTPATIENT)
Age: 65
Setting detail: SPECIMEN
Discharge: HOME | End: 2025-07-16

## 2025-07-16 ENCOUNTER — PROCEDURE VISIT (OUTPATIENT)
Dept: OBGYN CLINIC | Age: 65
End: 2025-07-16

## 2025-07-16 VITALS
HEIGHT: 60 IN | DIASTOLIC BLOOD PRESSURE: 60 MMHG | SYSTOLIC BLOOD PRESSURE: 106 MMHG | BODY MASS INDEX: 40.84 KG/M2 | WEIGHT: 208 LBS

## 2025-07-16 DIAGNOSIS — R87.610 ATYPICAL SQUAMOUS CELL CHANGES OF UNDETERMINED SIGNIFICANCE (ASCUS) ON CERVICAL CYTOLOGY WITH NEGATIVE HIGH RISK HUMAN PAPILLOMA VIRUS (HPV) TEST RESULT: Primary | ICD-10-CM

## 2025-07-16 NOTE — PROGRESS NOTES
[facility]    COLPOSCOPY PROCEDURE FORM    Chief Complaint:   Chief Complaint   Patient presents with    Procedure         2025  Sofia Guerra  No LMP recorded. Patient is postmenopausal.  65 y.o.      Past Medical History:   Diagnosis Date    Alcohol dependence (HCC)     CAD S/P percutaneous coronary angioplasty 12    LAD , ANGELINE    Depression     Dyslipidemia     HTN (hypertension)     Left ventricular diastolic dysfunction 2013    Obesity     Seborrheic keratoses, inflamed 2016    Skin sensation disturbance 2016         Past Surgical History:   Procedure Laterality Date    CARDIAC PROCEDURE N/A 2025    Left heart cath / coronary angiography performed by Ana Maria Mcneill MD at [facility] Cardiac Cath/IR    CARDIAC PROCEDURE N/A 2025    Percutaneous coronary intervention performed by Ana Maria Mcneill MD at [facility] Cardiac Cath/IR     SECTION      x2    CORONARY ANGIOPLASTY WITH STENT PLACEMENT      x3 total    MYRINGOTOMY AND TYMPANOSTOMY TUBE PLACEMENT Bilateral     TONGUE BIOPSY  2019    oral surgeon       Family History   Problem Relation Age of Onset    Cancer Paternal Grandmother         pancreatic    Hypertension Father     Macular Degen Mother     Hypertension Mother     Breast Cancer Maternal Aunt 50    Rheum Arthritis Half-Sister         thinks it is RA       Social History     Socioeconomic History    Marital status: Legally      Spouse name: Not on file    Number of children: Not on file    Years of education: Not on file    Highest education level: Not on file   Occupational History    Not on file   Tobacco Use    Smoking status: Former     Current packs/day: 0.00     Average packs/day: 1 pack/day for 40.0 years (40.0 ttl pk-yrs)     Types: Cigarettes     Start date: 1972     Quit date: 2012     Years since quittin.6    Smokeless tobacco: Never   Substance and Sexual Activity    Alcohol use: Yes

## 2025-07-21 ENCOUNTER — RESULTS FOLLOW-UP (OUTPATIENT)
Dept: OBGYN CLINIC | Age: 65
End: 2025-07-21

## 2025-07-21 LAB — SURGICAL PATHOLOGY REPORT: NORMAL

## 2025-07-22 NOTE — TELEPHONE ENCOUNTER
[facility] Result Note Patient Contact Communication   [address] [place] [address]      07/22/25   2:29 PM     Patients Name: Sofia Guerra   Medical Record Number: [id number]   Contact Serial Number: [id number]  YOB: 1960       Patients Phone Number: [phone]     Description of Recommendations:  The patient was contacted and her identity was confirmed with two of the specific identifiers listed above.  The information regarding her recent testing results and provider recommendations were reviewed with her in detail and all questions were answered.   Recent labs/Cultures/ Imaging Studies/Pathology reports and Urinalysis results are included:      Recommendations given by provider:     ----- Message from RANULFO Couch CNP sent at 7/21/2025  2:11 PM EDT -----  Patient had ECC with DR Lino, DAX 1 noted  Please review results with DR lino and schedule follow up accordingly.         Per Dr Lino, patient to have repeat pap,colp and ECC in 6 months.    The patient verbalized understanding of the information above and the recommendation by the provider. She will contact her PCP if any other questions arise or contact our office for any other clarifications.        Electronically signed by Fiorella Hinton on 7/22/2025 at 2:29 PM

## 2025-07-22 NOTE — TELEPHONE ENCOUNTER
----- Message from RANULFO Couch CNP sent at 7/21/2025  2:11 PM EDT -----  Patient had ECC with DAX Rodriguez 1 noted  Please review results with DR chávez and schedule follow up accordingly.

## 2025-08-08 RX ORDER — OMEPRAZOLE 20 MG/1
20 CAPSULE, DELAYED RELEASE ORAL DAILY
Qty: 100 CAPSULE | Refills: 2 | Status: SHIPPED | OUTPATIENT
Start: 2025-08-08

## 2025-08-12 ENCOUNTER — CARE COORDINATION (OUTPATIENT)
Dept: CARE COORDINATION | Age: 65
End: 2025-08-12

## 2025-08-13 ENCOUNTER — CARE COORDINATION (OUTPATIENT)
Dept: CARE COORDINATION | Age: 65
End: 2025-08-13

## 2025-08-13 DIAGNOSIS — Z98.61 CAD S/P PERCUTANEOUS CORONARY ANGIOPLASTY: Primary | ICD-10-CM

## 2025-08-13 DIAGNOSIS — I25.10 CAD S/P PERCUTANEOUS CORONARY ANGIOPLASTY: Primary | ICD-10-CM

## 2025-08-13 PROCEDURE — 1111F DSCHRG MED/CURRENT MED MERGE: CPT | Performed by: FAMILY MEDICINE

## 2025-08-27 ENCOUNTER — CARE COORDINATION (OUTPATIENT)
Dept: CARE COORDINATION | Age: 65
End: 2025-08-27

## 2025-08-28 ENCOUNTER — CARE COORDINATION (OUTPATIENT)
Dept: CASE MANAGEMENT | Age: 65
End: 2025-08-28

## 2025-08-29 ENCOUNTER — TELEPHONE (OUTPATIENT)
Dept: PRIMARY CARE CLINIC | Age: 65
End: 2025-08-29

## 2025-08-29 ENCOUNTER — CARE COORDINATION (OUTPATIENT)
Dept: CARE COORDINATION | Age: 65
End: 2025-08-29

## (undated) DEVICE — GUIDEWIRE VASC J 3 MM 0.035 INX210 CM FIX COR INQWIRE

## (undated) DEVICE — CATHETER DIAG AD 6FR L100CM COR GRN HYDRPHLC NYL JR 4 W/O

## (undated) DEVICE — CATH BLLN ANGIO 2X12MM SC EUPHORA RX

## (undated) DEVICE — CATHETER ANGIO JL3.5 0.056 INX6 FRX100 CM THRULUMEN EXPO

## (undated) DEVICE — GUIDEWIRE WITH ICE™ HYDROPHILIC COATING: Brand: LUGE™

## (undated) DEVICE — PACLITAXEL-COATED BALLOON CATHETER: Brand: AGENT™

## (undated) DEVICE — CATHETER GUIDE AD 6FR L100CM COR PERIPH GRN NYL PTFE XB L 3

## (undated) DEVICE — TRAY SURG CUST CRD CATH TOLEDO

## (undated) DEVICE — GLIDESHEATH SLENDER STAINLESS STEEL KIT: Brand: GLIDESHEATH SLENDER

## (undated) DEVICE — CATH BLLN ANGIO 2.50X12MM SC EUPHORA RX

## (undated) DEVICE — Device: Brand: PROWATER

## (undated) DEVICE — STRAP ARMBRD W1.5XL32IN FOAM STR YET SFT W/ HK AND LOOP